# Patient Record
Sex: MALE | Race: WHITE | Employment: OTHER | URBAN - METROPOLITAN AREA
[De-identification: names, ages, dates, MRNs, and addresses within clinical notes are randomized per-mention and may not be internally consistent; named-entity substitution may affect disease eponyms.]

---

## 2017-01-05 ENCOUNTER — OFFICE VISIT (OUTPATIENT)
Dept: CARDIOLOGY CLINIC | Age: 72
End: 2017-01-05

## 2017-01-05 VITALS
WEIGHT: 200 LBS | OXYGEN SATURATION: 95 % | SYSTOLIC BLOOD PRESSURE: 138 MMHG | HEART RATE: 79 BPM | BODY MASS INDEX: 28.63 KG/M2 | DIASTOLIC BLOOD PRESSURE: 66 MMHG | HEIGHT: 70 IN

## 2017-01-05 DIAGNOSIS — E78.5 HYPERLIPIDEMIA, UNSPECIFIED HYPERLIPIDEMIA TYPE: ICD-10-CM

## 2017-01-05 DIAGNOSIS — I25.10 ATHEROSCLEROSIS OF NATIVE CORONARY ARTERY OF NATIVE HEART WITHOUT ANGINA PECTORIS: Primary | ICD-10-CM

## 2017-01-05 DIAGNOSIS — Z98.61 S/P PTCA (PERCUTANEOUS TRANSLUMINAL CORONARY ANGIOPLASTY): ICD-10-CM

## 2017-01-05 DIAGNOSIS — I10 ESSENTIAL HYPERTENSION, BENIGN: ICD-10-CM

## 2017-01-05 PROCEDURE — 99214 OFFICE O/P EST MOD 30 MIN: CPT | Performed by: INTERNAL MEDICINE

## 2017-01-05 RX ORDER — PANTOPRAZOLE SODIUM 40 MG/1
40 TABLET, DELAYED RELEASE ORAL DAILY
COMMUNITY
Start: 2016-12-27 | End: 2017-07-10

## 2017-05-05 ENCOUNTER — TELEPHONE (OUTPATIENT)
Dept: CARDIOLOGY CLINIC | Age: 72
End: 2017-05-05

## 2017-07-10 ENCOUNTER — OFFICE VISIT (OUTPATIENT)
Dept: CARDIOLOGY CLINIC | Age: 72
End: 2017-07-10

## 2017-07-10 ENCOUNTER — HOSPITAL ENCOUNTER (OUTPATIENT)
Dept: NON INVASIVE DIAGNOSTICS | Age: 72
Discharge: HOME | End: 2017-07-10
Attending: INTERNAL MEDICINE | Admitting: INTERNAL MEDICINE

## 2017-07-10 VITALS
WEIGHT: 193 LBS | HEART RATE: 61 BPM | BODY MASS INDEX: 27.63 KG/M2 | HEIGHT: 70 IN | DIASTOLIC BLOOD PRESSURE: 82 MMHG | SYSTOLIC BLOOD PRESSURE: 138 MMHG

## 2017-07-10 DIAGNOSIS — I25.10 ATHEROSCLEROTIC HEART DISEASE OF NATIVE CORONARY ARTERY WITHOUT ANGINA PECTORIS: ICD-10-CM

## 2017-07-10 DIAGNOSIS — I25.10 ATHEROSCLEROSIS OF NATIVE CORONARY ARTERY OF NATIVE HEART WITHOUT ANGINA PECTORIS: Primary | ICD-10-CM

## 2017-07-10 DIAGNOSIS — E78.49 OTHER HYPERLIPIDEMIA: ICD-10-CM

## 2017-07-10 DIAGNOSIS — I10 ESSENTIAL HYPERTENSION, BENIGN: ICD-10-CM

## 2017-07-10 DIAGNOSIS — I65.23 BILATERAL CAROTID ARTERY STENOSIS: ICD-10-CM

## 2017-07-10 PROCEDURE — G8427 DOCREV CUR MEDS BY ELIG CLIN: HCPCS | Performed by: INTERNAL MEDICINE

## 2017-07-10 PROCEDURE — G8419 CALC BMI OUT NRM PARAM NOF/U: HCPCS | Performed by: INTERNAL MEDICINE

## 2017-07-10 PROCEDURE — 3017F COLORECTAL CA SCREEN DOC REV: CPT | Performed by: INTERNAL MEDICINE

## 2017-07-10 PROCEDURE — 4040F PNEUMOC VAC/ADMIN/RCVD: CPT | Performed by: INTERNAL MEDICINE

## 2017-07-10 PROCEDURE — G8598 ASA/ANTIPLAT THER USED: HCPCS | Performed by: INTERNAL MEDICINE

## 2017-07-10 PROCEDURE — 1123F ACP DISCUSS/DSCN MKR DOCD: CPT | Performed by: INTERNAL MEDICINE

## 2017-07-10 PROCEDURE — 99214 OFFICE O/P EST MOD 30 MIN: CPT | Performed by: INTERNAL MEDICINE

## 2017-07-10 PROCEDURE — 1036F TOBACCO NON-USER: CPT | Performed by: INTERNAL MEDICINE

## 2017-07-10 RX ORDER — LISINOPRIL 20 MG/1
40 TABLET ORAL
Refills: 0 | COMMUNITY
Start: 2017-05-19 | End: 2019-08-09

## 2017-10-27 ENCOUNTER — TELEPHONE (OUTPATIENT)
Dept: CARDIOLOGY CLINIC | Age: 72
End: 2017-10-27

## 2018-04-17 ENCOUNTER — TELEPHONE (OUTPATIENT)
Dept: ENT CLINIC | Age: 73
End: 2018-04-17

## 2018-04-18 ENCOUNTER — OFFICE VISIT (OUTPATIENT)
Dept: ENT CLINIC | Age: 73
End: 2018-04-18

## 2018-04-18 VITALS
HEART RATE: 50 BPM | BODY MASS INDEX: 27.92 KG/M2 | HEIGHT: 70 IN | WEIGHT: 195 LBS | DIASTOLIC BLOOD PRESSURE: 71 MMHG | SYSTOLIC BLOOD PRESSURE: 167 MMHG

## 2018-04-18 DIAGNOSIS — R04.0 EPISTAXIS: Primary | ICD-10-CM

## 2018-04-18 DIAGNOSIS — H61.23 BILATERAL IMPACTED CERUMEN: ICD-10-CM

## 2018-04-18 DIAGNOSIS — H91.93 BILATERAL HEARING LOSS, UNSPECIFIED HEARING LOSS TYPE: ICD-10-CM

## 2018-04-18 DIAGNOSIS — H93.13 SUBJECTIVE TINNITUS OF BOTH EARS: ICD-10-CM

## 2018-04-18 PROCEDURE — 1036F TOBACCO NON-USER: CPT | Performed by: OTOLARYNGOLOGY

## 2018-04-18 PROCEDURE — 1123F ACP DISCUSS/DSCN MKR DOCD: CPT | Performed by: OTOLARYNGOLOGY

## 2018-04-18 PROCEDURE — G8417 CALC BMI ABV UP PARAM F/U: HCPCS | Performed by: OTOLARYNGOLOGY

## 2018-04-18 PROCEDURE — G8598 ASA/ANTIPLAT THER USED: HCPCS | Performed by: OTOLARYNGOLOGY

## 2018-04-18 PROCEDURE — G8427 DOCREV CUR MEDS BY ELIG CLIN: HCPCS | Performed by: OTOLARYNGOLOGY

## 2018-04-18 PROCEDURE — 3017F COLORECTAL CA SCREEN DOC REV: CPT | Performed by: OTOLARYNGOLOGY

## 2018-04-18 PROCEDURE — 99203 OFFICE O/P NEW LOW 30 MIN: CPT | Performed by: OTOLARYNGOLOGY

## 2018-04-18 PROCEDURE — 4040F PNEUMOC VAC/ADMIN/RCVD: CPT | Performed by: OTOLARYNGOLOGY

## 2018-04-18 RX ORDER — HYDRALAZINE HYDROCHLORIDE 25 MG/1
25 TABLET, FILM COATED ORAL 3 TIMES DAILY
COMMUNITY
Start: 2018-01-10 | End: 2018-04-23 | Stop reason: ALTCHOICE

## 2018-04-18 RX ORDER — ATENOLOL 50 MG/1
TABLET ORAL
Refills: 2 | COMMUNITY
Start: 2018-04-08 | End: 2018-05-08

## 2018-04-23 ENCOUNTER — TELEPHONE (OUTPATIENT)
Dept: CARDIOLOGY CLINIC | Age: 73
End: 2018-04-23

## 2018-04-23 ENCOUNTER — OFFICE VISIT (OUTPATIENT)
Dept: CARDIOLOGY CLINIC | Age: 73
End: 2018-04-23

## 2018-04-23 VITALS
DIASTOLIC BLOOD PRESSURE: 90 MMHG | HEART RATE: 72 BPM | SYSTOLIC BLOOD PRESSURE: 180 MMHG | WEIGHT: 197.12 LBS | HEIGHT: 70 IN | BODY MASS INDEX: 28.22 KG/M2

## 2018-04-23 DIAGNOSIS — R94.8 ABNORMAL RESULTS OF FUNCTION STUDIES OF OTHER ORGANS AND SYSTEMS: ICD-10-CM

## 2018-04-23 DIAGNOSIS — I10 ESSENTIAL HYPERTENSION, BENIGN: Primary | ICD-10-CM

## 2018-04-23 DIAGNOSIS — E78.49 OTHER HYPERLIPIDEMIA: ICD-10-CM

## 2018-04-23 DIAGNOSIS — R06.02 SOB (SHORTNESS OF BREATH): ICD-10-CM

## 2018-04-23 DIAGNOSIS — I25.10 ATHEROSCLEROSIS OF NATIVE CORONARY ARTERY OF NATIVE HEART WITHOUT ANGINA PECTORIS: ICD-10-CM

## 2018-04-23 PROCEDURE — G8417 CALC BMI ABV UP PARAM F/U: HCPCS | Performed by: INTERNAL MEDICINE

## 2018-04-23 PROCEDURE — 3017F COLORECTAL CA SCREEN DOC REV: CPT | Performed by: INTERNAL MEDICINE

## 2018-04-23 PROCEDURE — 99214 OFFICE O/P EST MOD 30 MIN: CPT | Performed by: INTERNAL MEDICINE

## 2018-04-23 PROCEDURE — G8598 ASA/ANTIPLAT THER USED: HCPCS | Performed by: INTERNAL MEDICINE

## 2018-04-23 PROCEDURE — 4040F PNEUMOC VAC/ADMIN/RCVD: CPT | Performed by: INTERNAL MEDICINE

## 2018-04-23 PROCEDURE — 1123F ACP DISCUSS/DSCN MKR DOCD: CPT | Performed by: INTERNAL MEDICINE

## 2018-04-23 PROCEDURE — G8427 DOCREV CUR MEDS BY ELIG CLIN: HCPCS | Performed by: INTERNAL MEDICINE

## 2018-04-23 PROCEDURE — 1036F TOBACCO NON-USER: CPT | Performed by: INTERNAL MEDICINE

## 2018-04-23 RX ORDER — DOXAZOSIN MESYLATE 4 MG/1
TABLET ORAL
Qty: 60 TABLET | Refills: 3 | Status: SHIPPED | OUTPATIENT
Start: 2018-04-23 | End: 2018-04-23 | Stop reason: SDUPTHER

## 2018-04-23 RX ORDER — DOXAZOSIN 8 MG/1
TABLET ORAL
Qty: 30 TABLET | Refills: 11 | Status: SHIPPED | OUTPATIENT
Start: 2018-04-23 | End: 2018-07-18 | Stop reason: SDUPTHER

## 2018-04-24 ENCOUNTER — HOSPITAL ENCOUNTER (OUTPATIENT)
Dept: ULTRASOUND IMAGING | Age: 73
Discharge: HOME | End: 2018-04-24
Attending: INTERNAL MEDICINE | Admitting: INTERNAL MEDICINE

## 2018-04-24 DIAGNOSIS — I10 ESSENTIAL HYPERTENSION, BENIGN: ICD-10-CM

## 2018-04-24 DIAGNOSIS — I10 ESSENTIAL (PRIMARY) HYPERTENSION: ICD-10-CM

## 2018-04-24 LAB
ANION GAP SERPL CALCULATED.3IONS-SCNC: 11 MMOL/L (ref 3–16)
BUN BLDV-MCNC: 25 MG/DL (ref 7–20)
CALCIUM SERPL-MCNC: 9.6 MG/DL (ref 8.3–10.6)
CHLORIDE BLD-SCNC: 103 MMOL/L (ref 99–110)
CO2: 31 MMOL/L (ref 21–32)
CREAT SERPL-MCNC: 1 MG/DL (ref 0.8–1.3)
GFR AFRICAN AMERICAN: >60
GFR NON-AFRICAN AMERICAN: >60
GLUCOSE BLD-MCNC: 151 MG/DL (ref 70–99)
POTASSIUM SERPL-SCNC: 4.3 MMOL/L (ref 3.5–5.1)
PROSTATE SPECIFIC ANTIGEN: 1.83 NG/ML (ref 0–4)
SODIUM BLD-SCNC: 145 MMOL/L (ref 136–145)

## 2018-05-08 ENCOUNTER — OFFICE VISIT (OUTPATIENT)
Dept: CARDIOLOGY CLINIC | Age: 73
End: 2018-05-08

## 2018-05-08 VITALS
WEIGHT: 199.5 LBS | HEART RATE: 77 BPM | BODY MASS INDEX: 28.56 KG/M2 | HEIGHT: 70 IN | DIASTOLIC BLOOD PRESSURE: 70 MMHG | OXYGEN SATURATION: 92 % | SYSTOLIC BLOOD PRESSURE: 160 MMHG

## 2018-05-08 DIAGNOSIS — I25.10 ATHEROSCLEROSIS OF NATIVE CORONARY ARTERY OF NATIVE HEART WITHOUT ANGINA PECTORIS: ICD-10-CM

## 2018-05-08 DIAGNOSIS — I10 ESSENTIAL HYPERTENSION, BENIGN: Primary | ICD-10-CM

## 2018-05-08 DIAGNOSIS — I65.23 BILATERAL CAROTID ARTERY STENOSIS: ICD-10-CM

## 2018-05-08 DIAGNOSIS — E78.49 OTHER HYPERLIPIDEMIA: ICD-10-CM

## 2018-05-08 PROCEDURE — 99214 OFFICE O/P EST MOD 30 MIN: CPT | Performed by: NURSE PRACTITIONER

## 2018-05-08 PROCEDURE — 3017F COLORECTAL CA SCREEN DOC REV: CPT | Performed by: NURSE PRACTITIONER

## 2018-05-08 PROCEDURE — G8427 DOCREV CUR MEDS BY ELIG CLIN: HCPCS | Performed by: NURSE PRACTITIONER

## 2018-05-08 PROCEDURE — 1036F TOBACCO NON-USER: CPT | Performed by: NURSE PRACTITIONER

## 2018-05-08 PROCEDURE — G8417 CALC BMI ABV UP PARAM F/U: HCPCS | Performed by: NURSE PRACTITIONER

## 2018-05-08 PROCEDURE — 1123F ACP DISCUSS/DSCN MKR DOCD: CPT | Performed by: NURSE PRACTITIONER

## 2018-05-08 PROCEDURE — G8598 ASA/ANTIPLAT THER USED: HCPCS | Performed by: NURSE PRACTITIONER

## 2018-05-08 PROCEDURE — 4040F PNEUMOC VAC/ADMIN/RCVD: CPT | Performed by: NURSE PRACTITIONER

## 2018-05-08 RX ORDER — CARVEDILOL 12.5 MG/1
12.5 TABLET ORAL 2 TIMES DAILY WITH MEALS
Qty: 60 TABLET | Refills: 3 | Status: SHIPPED | OUTPATIENT
Start: 2018-05-08 | End: 2018-09-03 | Stop reason: SDUPTHER

## 2018-05-09 ENCOUNTER — OFFICE VISIT (OUTPATIENT)
Dept: ENT CLINIC | Age: 73
End: 2018-05-09

## 2018-05-09 VITALS
HEART RATE: 55 BPM | BODY MASS INDEX: 28.2 KG/M2 | WEIGHT: 197 LBS | HEIGHT: 70 IN | DIASTOLIC BLOOD PRESSURE: 84 MMHG | SYSTOLIC BLOOD PRESSURE: 162 MMHG

## 2018-05-09 DIAGNOSIS — R04.0 EPISTAXIS: ICD-10-CM

## 2018-05-09 PROCEDURE — 99999 PR OFFICE/OUTPT VISIT,PROCEDURE ONLY: CPT | Performed by: OTOLARYNGOLOGY

## 2018-05-09 PROCEDURE — 30903 CONTROL OF NOSEBLEED: CPT | Performed by: OTOLARYNGOLOGY

## 2018-05-14 ENCOUNTER — TELEPHONE (OUTPATIENT)
Dept: CARDIOLOGY CLINIC | Age: 73
End: 2018-05-14

## 2018-05-29 ENCOUNTER — OFFICE VISIT (OUTPATIENT)
Dept: CARDIOLOGY CLINIC | Age: 73
End: 2018-05-29

## 2018-05-29 VITALS
DIASTOLIC BLOOD PRESSURE: 62 MMHG | WEIGHT: 196.8 LBS | SYSTOLIC BLOOD PRESSURE: 140 MMHG | BODY MASS INDEX: 28.18 KG/M2 | HEIGHT: 70 IN | HEART RATE: 60 BPM

## 2018-05-29 DIAGNOSIS — I65.23 BILATERAL CAROTID ARTERY STENOSIS: ICD-10-CM

## 2018-05-29 DIAGNOSIS — I25.10 ATHEROSCLEROSIS OF NATIVE CORONARY ARTERY OF NATIVE HEART WITHOUT ANGINA PECTORIS: ICD-10-CM

## 2018-05-29 DIAGNOSIS — E78.2 MIXED HYPERLIPIDEMIA: ICD-10-CM

## 2018-05-29 DIAGNOSIS — I10 ESSENTIAL HYPERTENSION, BENIGN: Primary | ICD-10-CM

## 2018-05-29 PROCEDURE — G8598 ASA/ANTIPLAT THER USED: HCPCS | Performed by: NURSE PRACTITIONER

## 2018-05-29 PROCEDURE — 4040F PNEUMOC VAC/ADMIN/RCVD: CPT | Performed by: NURSE PRACTITIONER

## 2018-05-29 PROCEDURE — G8427 DOCREV CUR MEDS BY ELIG CLIN: HCPCS | Performed by: NURSE PRACTITIONER

## 2018-05-29 PROCEDURE — 1036F TOBACCO NON-USER: CPT | Performed by: NURSE PRACTITIONER

## 2018-05-29 PROCEDURE — 1123F ACP DISCUSS/DSCN MKR DOCD: CPT | Performed by: NURSE PRACTITIONER

## 2018-05-29 PROCEDURE — 3017F COLORECTAL CA SCREEN DOC REV: CPT | Performed by: NURSE PRACTITIONER

## 2018-05-29 PROCEDURE — G8417 CALC BMI ABV UP PARAM F/U: HCPCS | Performed by: NURSE PRACTITIONER

## 2018-05-29 PROCEDURE — 99214 OFFICE O/P EST MOD 30 MIN: CPT | Performed by: NURSE PRACTITIONER

## 2018-06-05 ENCOUNTER — HOSPITAL ENCOUNTER (OUTPATIENT)
Dept: VASCULAR LAB | Age: 73
Discharge: HOME | End: 2018-06-05
Attending: NURSE PRACTITIONER | Admitting: NURSE PRACTITIONER

## 2018-06-05 DIAGNOSIS — I65.23 BILATERAL CAROTID ARTERY STENOSIS: ICD-10-CM

## 2018-06-05 DIAGNOSIS — I65.23 OCCLUSION AND STENOSIS OF BILATERAL CAROTID ARTERIES: ICD-10-CM

## 2018-06-08 ENCOUNTER — TELEPHONE (OUTPATIENT)
Dept: CARDIOLOGY CLINIC | Age: 73
End: 2018-06-08

## 2018-07-18 ENCOUNTER — OFFICE VISIT (OUTPATIENT)
Dept: CARDIOLOGY CLINIC | Age: 73
End: 2018-07-18

## 2018-07-18 VITALS
BODY MASS INDEX: 28.35 KG/M2 | SYSTOLIC BLOOD PRESSURE: 154 MMHG | HEART RATE: 60 BPM | DIASTOLIC BLOOD PRESSURE: 81 MMHG | WEIGHT: 198 LBS | HEIGHT: 70 IN

## 2018-07-18 DIAGNOSIS — I25.10 ATHEROSCLEROSIS OF NATIVE CORONARY ARTERY OF NATIVE HEART WITHOUT ANGINA PECTORIS: Primary | ICD-10-CM

## 2018-07-18 DIAGNOSIS — E78.49 OTHER HYPERLIPIDEMIA: ICD-10-CM

## 2018-07-18 DIAGNOSIS — I10 ESSENTIAL HYPERTENSION, BENIGN: ICD-10-CM

## 2018-07-18 PROCEDURE — G8598 ASA/ANTIPLAT THER USED: HCPCS | Performed by: INTERNAL MEDICINE

## 2018-07-18 PROCEDURE — 1101F PT FALLS ASSESS-DOCD LE1/YR: CPT | Performed by: INTERNAL MEDICINE

## 2018-07-18 PROCEDURE — 1123F ACP DISCUSS/DSCN MKR DOCD: CPT | Performed by: INTERNAL MEDICINE

## 2018-07-18 PROCEDURE — 99214 OFFICE O/P EST MOD 30 MIN: CPT | Performed by: INTERNAL MEDICINE

## 2018-07-18 PROCEDURE — 3017F COLORECTAL CA SCREEN DOC REV: CPT | Performed by: INTERNAL MEDICINE

## 2018-07-18 PROCEDURE — 1036F TOBACCO NON-USER: CPT | Performed by: INTERNAL MEDICINE

## 2018-07-18 PROCEDURE — 4040F PNEUMOC VAC/ADMIN/RCVD: CPT | Performed by: INTERNAL MEDICINE

## 2018-07-18 PROCEDURE — G8417 CALC BMI ABV UP PARAM F/U: HCPCS | Performed by: INTERNAL MEDICINE

## 2018-07-18 PROCEDURE — G8427 DOCREV CUR MEDS BY ELIG CLIN: HCPCS | Performed by: INTERNAL MEDICINE

## 2018-07-18 RX ORDER — DOXAZOSIN MESYLATE 4 MG/1
4 TABLET ORAL EVERY MORNING
Qty: 60 TABLET | Refills: 6
Start: 2018-07-18 | End: 2018-10-08 | Stop reason: ALTCHOICE

## 2018-07-18 RX ORDER — DOXAZOSIN MESYLATE 4 MG/1
4 TABLET ORAL 2 TIMES DAILY
Qty: 60 TABLET | Refills: 6
Start: 2018-07-18 | End: 2018-07-18 | Stop reason: SDUPTHER

## 2018-07-18 RX ORDER — DOXAZOSIN 8 MG/1
8 TABLET ORAL NIGHTLY
Qty: 30 TABLET | Refills: 3
Start: 2018-07-18 | End: 2018-10-08 | Stop reason: SDUPTHER

## 2018-07-18 NOTE — PROGRESS NOTES
hypertrophy is present.   -There is reversal of E/A inflow velocities across the mitral valve.   -Diastolic filling parameters suggests grade I diastolic dysfunction . E/e'=12.3 .   -No evidence of significant valvular insufficiency. GXT Myoview 7/2016  Summary    Small sized inferior fixed defect consistent with diaphragmatic artifact in    the territory of the mid and distal RCA .    Normal LV size and systolic function. Assessment/Plan:    1. Coronary artery disease: Stable. No anginal syptoms. 2005 Cath> Cypher AARON to the LAD.   8/2013 GXT Shant> normal perfusion/EF 78%, +CHAIREZ with exercise  7/2016 GXT Shant>small sized inferior fixed defect consistent with diaphragmatic artifact in the territory of the mid and distal RCA, normal LV size and function   2. Essential hypertension, benign:     3. Other and unspecified hyperlipidemia:  Stable. Managed per PCP. 4.   Carotid stenosis -stable, doppler 6/18: <50% bilateral    Plan:  Change Cardura to 4mg in am and 8mg at night. He has been advised to take his blood pressure pills together as opposed to splitting them up throughout the night. Obtain CTA abdominal aorta w/ contrast to evaluate for PAD and renal artery stenosis. Repeat stress echo. FU 3 months. Scribe's attestation: This note was scribed in the presence of Dr Ava Castaneda MD by Shiloh Tate RN. I appreciate the opportunity of cooperating in the care of this individual.    Rossy Presley. Ava Castaneda M.D., [facility] [place]    The scribe's documentation has been prepared under my direction and personally reviewed by me in its entirety. I confirm that the note above accurately reflects all work, treatment, procedures, and medical decision making performed by me.

## 2018-07-19 ENCOUNTER — TELEPHONE (OUTPATIENT)
Dept: CARDIOLOGY CLINIC | Age: 73
End: 2018-07-19

## 2018-07-19 NOTE — TELEPHONE ENCOUNTER
Patient is saying his cardura was increased and he needs to get a new rx for cardura 4mg .  Please call pharmacy with new rx

## 2018-08-10 ENCOUNTER — TELEPHONE (OUTPATIENT)
Dept: CARDIOLOGY CLINIC | Age: 73
End: 2018-08-10

## 2018-08-13 ENCOUNTER — HOSPITAL ENCOUNTER (OUTPATIENT)
Dept: NON INVASIVE DIAGNOSTICS | Age: 73
Discharge: HOME | End: 2018-08-13
Attending: INTERNAL MEDICINE | Admitting: INTERNAL MEDICINE

## 2018-08-13 DIAGNOSIS — I25.10 ATHEROSCLEROTIC HEART DISEASE OF NATIVE CORONARY ARTERY WITHOUT ANGINA PECTORIS: ICD-10-CM

## 2018-08-13 DIAGNOSIS — I10 ESSENTIAL HYPERTENSION, BENIGN: ICD-10-CM

## 2018-08-13 DIAGNOSIS — Z01.818 PRE-OP TESTING: Primary | ICD-10-CM

## 2018-08-15 ENCOUNTER — TELEPHONE (OUTPATIENT)
Dept: CARDIOLOGY CLINIC | Age: 73
End: 2018-08-15

## 2018-09-06 RX ORDER — CARVEDILOL 12.5 MG/1
TABLET ORAL
Qty: 180 TABLET | Refills: 3 | Status: SHIPPED | OUTPATIENT
Start: 2018-09-06 | End: 2019-02-15 | Stop reason: SDUPTHER

## 2018-10-08 ENCOUNTER — OFFICE VISIT (OUTPATIENT)
Dept: CARDIOLOGY CLINIC | Age: 73
End: 2018-10-08
Payer: MEDICARE

## 2018-10-08 VITALS
SYSTOLIC BLOOD PRESSURE: 160 MMHG | BODY MASS INDEX: 28.06 KG/M2 | HEART RATE: 71 BPM | HEIGHT: 70 IN | OXYGEN SATURATION: 96 % | DIASTOLIC BLOOD PRESSURE: 94 MMHG | WEIGHT: 196 LBS

## 2018-10-08 DIAGNOSIS — I65.23 BILATERAL CAROTID ARTERY STENOSIS: ICD-10-CM

## 2018-10-08 DIAGNOSIS — E78.49 OTHER HYPERLIPIDEMIA: ICD-10-CM

## 2018-10-08 DIAGNOSIS — I10 ESSENTIAL HYPERTENSION, BENIGN: ICD-10-CM

## 2018-10-08 DIAGNOSIS — I25.10 ATHEROSCLEROSIS OF NATIVE CORONARY ARTERY OF NATIVE HEART WITHOUT ANGINA PECTORIS: Primary | ICD-10-CM

## 2018-10-08 PROCEDURE — 3017F COLORECTAL CA SCREEN DOC REV: CPT | Performed by: INTERNAL MEDICINE

## 2018-10-08 PROCEDURE — 1036F TOBACCO NON-USER: CPT | Performed by: INTERNAL MEDICINE

## 2018-10-08 PROCEDURE — 99214 OFFICE O/P EST MOD 30 MIN: CPT | Performed by: INTERNAL MEDICINE

## 2018-10-08 PROCEDURE — 4040F PNEUMOC VAC/ADMIN/RCVD: CPT | Performed by: INTERNAL MEDICINE

## 2018-10-08 PROCEDURE — G8417 CALC BMI ABV UP PARAM F/U: HCPCS | Performed by: INTERNAL MEDICINE

## 2018-10-08 PROCEDURE — G8484 FLU IMMUNIZE NO ADMIN: HCPCS | Performed by: INTERNAL MEDICINE

## 2018-10-08 PROCEDURE — 1123F ACP DISCUSS/DSCN MKR DOCD: CPT | Performed by: INTERNAL MEDICINE

## 2018-10-08 PROCEDURE — G8427 DOCREV CUR MEDS BY ELIG CLIN: HCPCS | Performed by: INTERNAL MEDICINE

## 2018-10-08 PROCEDURE — 1101F PT FALLS ASSESS-DOCD LE1/YR: CPT | Performed by: INTERNAL MEDICINE

## 2018-10-08 PROCEDURE — G8598 ASA/ANTIPLAT THER USED: HCPCS | Performed by: INTERNAL MEDICINE

## 2018-10-08 RX ORDER — DOXAZOSIN 8 MG/1
8 TABLET ORAL 2 TIMES DAILY
Qty: 60 TABLET | Refills: 5 | Status: SHIPPED | OUTPATIENT
Start: 2018-10-08 | End: 2018-10-15 | Stop reason: SDUPTHER

## 2018-10-08 NOTE — PROGRESS NOTES
hypertrophy is present.   -There is reversal of E/A inflow velocities across the mitral valve.   -Diastolic filling parameters suggests grade I diastolic dysfunction . E/e'=12.3 .   -No evidence of significant valvular insufficiency. GXT Myoview 7/2016  Summary    Small sized inferior fixed defect consistent with diaphragmatic artifact in    the territory of the mid and distal RCA .    Normal LV size and systolic function. Assessment/Plan:    1. Coronary artery disease: Stable. No anginal syptoms. 2005 Cath> Cypher AARON to the LAD.   8/2013 GXT Shant> normal perfusion/EF 78%, +CHAIREZ with exercise  7/2016 GXT Shant>small sized inferior fixed defect consistent with diaphragmatic artifact in the territory of the mid and distal RCA, normal LV size and function   2. Essential hypertension, benign:     3. Other and unspecified hyperlipidemia:  Stable. Managed per PCP. 4.   Carotid stenosis -stable, doppler 6/18: <50% bilateral    Plan:  Change Cardura to 8 mg twice daily. Follow up in 4 months    Scribe's attestation: This note was scribed in the presence of Dr Irma Toscano MD by Evelyn Hicks RN. I appreciate the opportunity of cooperating in the care of this individual.    Edouard Calderon. Irma Toscano M.D., [facility] [place]    The scribe's documentation has been prepared under my direction and personally reviewed by me in its entirety. I confirm that the note above accurately reflects all work, treatment, procedures, and medical decision making performed by me.

## 2018-10-15 RX ORDER — DOXAZOSIN 8 MG/1
8 TABLET ORAL 2 TIMES DAILY
Qty: 180 TABLET | Refills: 1 | Status: SHIPPED | OUTPATIENT
Start: 2018-10-15 | End: 2019-02-15 | Stop reason: SDUPTHER

## 2019-02-15 ENCOUNTER — OFFICE VISIT (OUTPATIENT)
Dept: CARDIOLOGY CLINIC | Age: 74
End: 2019-02-15
Payer: MEDICARE

## 2019-02-15 VITALS
BODY MASS INDEX: 28.53 KG/M2 | HEIGHT: 70 IN | HEART RATE: 64 BPM | WEIGHT: 199.3 LBS | SYSTOLIC BLOOD PRESSURE: 178 MMHG | DIASTOLIC BLOOD PRESSURE: 90 MMHG

## 2019-02-15 DIAGNOSIS — I10 ESSENTIAL HYPERTENSION: ICD-10-CM

## 2019-02-15 DIAGNOSIS — E78.5 HYPERLIPIDEMIA, UNSPECIFIED HYPERLIPIDEMIA TYPE: ICD-10-CM

## 2019-02-15 DIAGNOSIS — I25.10 CORONARY ARTERY DISEASE INVOLVING NATIVE CORONARY ARTERY OF NATIVE HEART WITHOUT ANGINA PECTORIS: Primary | ICD-10-CM

## 2019-02-15 DIAGNOSIS — I65.23 BILATERAL CAROTID ARTERY STENOSIS: ICD-10-CM

## 2019-02-15 PROCEDURE — 4040F PNEUMOC VAC/ADMIN/RCVD: CPT | Performed by: INTERNAL MEDICINE

## 2019-02-15 PROCEDURE — G8484 FLU IMMUNIZE NO ADMIN: HCPCS | Performed by: INTERNAL MEDICINE

## 2019-02-15 PROCEDURE — G8417 CALC BMI ABV UP PARAM F/U: HCPCS | Performed by: INTERNAL MEDICINE

## 2019-02-15 PROCEDURE — 99214 OFFICE O/P EST MOD 30 MIN: CPT | Performed by: INTERNAL MEDICINE

## 2019-02-15 PROCEDURE — 3017F COLORECTAL CA SCREEN DOC REV: CPT | Performed by: INTERNAL MEDICINE

## 2019-02-15 PROCEDURE — 1123F ACP DISCUSS/DSCN MKR DOCD: CPT | Performed by: INTERNAL MEDICINE

## 2019-02-15 PROCEDURE — G8598 ASA/ANTIPLAT THER USED: HCPCS | Performed by: INTERNAL MEDICINE

## 2019-02-15 PROCEDURE — G8427 DOCREV CUR MEDS BY ELIG CLIN: HCPCS | Performed by: INTERNAL MEDICINE

## 2019-02-15 PROCEDURE — 1101F PT FALLS ASSESS-DOCD LE1/YR: CPT | Performed by: INTERNAL MEDICINE

## 2019-02-15 PROCEDURE — 1036F TOBACCO NON-USER: CPT | Performed by: INTERNAL MEDICINE

## 2019-03-03 RX ORDER — DOXAZOSIN 8 MG/1
8 TABLET ORAL 2 TIMES DAILY
Qty: 180 TABLET | Refills: 3 | Status: SHIPPED | OUTPATIENT
Start: 2019-03-03 | End: 2019-05-10 | Stop reason: SDUPTHER

## 2019-03-03 RX ORDER — CARVEDILOL 12.5 MG/1
12.5 TABLET ORAL 2 TIMES DAILY
Qty: 180 TABLET | Refills: 3 | Status: SHIPPED | OUTPATIENT
Start: 2019-03-03 | End: 2019-09-04 | Stop reason: SDUPTHER

## 2019-05-08 NOTE — TELEPHONE ENCOUNTER
Medication Question/Concern    What is the name of the medication you need to speak with someone about? Doseage of the medication:    How are you taking this medication:    What issues/concerns are you having with this medication:                Medication Refill    When was your last appointment with cardiology? 02/15/19 (Upcoming appt 09/04/19 with LES)  (if 1year or longer, please schedule an appointment)    Medication needing refilled:doxazosin (CARDURA) 8 MG tablet     Doseage of the medication:8 mg    How are you taking this medication (QD, BID, TID, QID, PRN):1 tab BID    Patient want a 30 or 90 day supply called in:90    Which Pharmacy are we sending the medication to:[facility]/pharmacy #[id number]- [place], [place] - [address] [phone] Amber Thomas [phone]    Pharmacy Phone number:    Pharmacy Fax number:

## 2019-05-08 NOTE — TELEPHONE ENCOUNTER
Pended a refill request for your approval for Cardura 8 mg #180. Please advise. Last OV with LES 2/15/19  Plan:  Mr. Nguyễn Carrera has a stable cardiac status. He continues to struggle with elevated pressures at times, will monitor. 1. No med changes at this time. [place] BP control seems better based on his readings  2. Will continue with risk factor modifications.   3. Return for regular follow up in 6 months.

## 2019-05-10 RX ORDER — DOXAZOSIN 8 MG/1
8 TABLET ORAL 2 TIMES DAILY
Qty: 180 TABLET | Refills: 3 | Status: SHIPPED | OUTPATIENT
Start: 2019-05-10 | End: 2019-09-04 | Stop reason: SDUPTHER

## 2019-07-26 ENCOUNTER — TELEPHONE (OUTPATIENT)
Dept: CARDIOLOGY CLINIC | Age: 74
End: 2019-07-26

## 2019-08-09 ENCOUNTER — OFFICE VISIT (OUTPATIENT)
Dept: CARDIOLOGY CLINIC | Age: 74
End: 2019-08-09
Payer: MEDICARE

## 2019-08-09 VITALS
HEART RATE: 60 BPM | WEIGHT: 196 LBS | SYSTOLIC BLOOD PRESSURE: 190 MMHG | OXYGEN SATURATION: 94 % | BODY MASS INDEX: 28.06 KG/M2 | HEIGHT: 70 IN | DIASTOLIC BLOOD PRESSURE: 90 MMHG

## 2019-08-09 DIAGNOSIS — E78.49 OTHER HYPERLIPIDEMIA: ICD-10-CM

## 2019-08-09 DIAGNOSIS — I25.10 CORONARY ARTERY DISEASE INVOLVING NATIVE CORONARY ARTERY OF NATIVE HEART WITHOUT ANGINA PECTORIS: ICD-10-CM

## 2019-08-09 DIAGNOSIS — I10 ESSENTIAL HYPERTENSION: Primary | ICD-10-CM

## 2019-08-09 PROCEDURE — G8427 DOCREV CUR MEDS BY ELIG CLIN: HCPCS | Performed by: NURSE PRACTITIONER

## 2019-08-09 PROCEDURE — 99214 OFFICE O/P EST MOD 30 MIN: CPT | Performed by: NURSE PRACTITIONER

## 2019-08-09 PROCEDURE — G8598 ASA/ANTIPLAT THER USED: HCPCS | Performed by: NURSE PRACTITIONER

## 2019-08-09 PROCEDURE — 1036F TOBACCO NON-USER: CPT | Performed by: NURSE PRACTITIONER

## 2019-08-09 PROCEDURE — 4040F PNEUMOC VAC/ADMIN/RCVD: CPT | Performed by: NURSE PRACTITIONER

## 2019-08-09 PROCEDURE — G8417 CALC BMI ABV UP PARAM F/U: HCPCS | Performed by: NURSE PRACTITIONER

## 2019-08-09 PROCEDURE — 1123F ACP DISCUSS/DSCN MKR DOCD: CPT | Performed by: NURSE PRACTITIONER

## 2019-08-09 RX ORDER — LISINOPRIL AND HYDROCHLOROTHIAZIDE 25; 20 MG/1; MG/1
1 TABLET ORAL DAILY
Qty: 30 TABLET | Refills: 3
Start: 2019-08-09

## 2019-08-09 RX ORDER — LISINOPRIL 40 MG/1
20 TABLET ORAL DAILY
Qty: 30 TABLET | Refills: 3
Start: 2019-08-09 | End: 2019-12-04 | Stop reason: ALTCHOICE

## 2019-08-09 RX ORDER — LISINOPRIL 40 MG/1
40 TABLET ORAL DAILY
COMMUNITY
End: 2019-08-09

## 2019-08-16 ENCOUNTER — TELEPHONE (OUTPATIENT)
Dept: CARDIOLOGY CLINIC | Age: 74
End: 2019-08-16

## 2019-08-29 ENCOUNTER — TELEPHONE (OUTPATIENT)
Dept: CARDIOLOGY CLINIC | Age: 74
End: 2019-08-29

## 2019-08-29 ENCOUNTER — HOSPITAL ENCOUNTER (EMERGENCY)
Age: 74
Discharge: HOME | End: 2019-08-29
Attending: EMERGENCY MEDICINE
Payer: MEDICARE

## 2019-08-29 VITALS
OXYGEN SATURATION: 95 % | WEIGHT: 195 LBS | HEIGHT: 70 IN | HEART RATE: 64 BPM | RESPIRATION RATE: 16 BRPM | TEMPERATURE: 97.3 F | DIASTOLIC BLOOD PRESSURE: 64 MMHG | SYSTOLIC BLOOD PRESSURE: 111 MMHG | BODY MASS INDEX: 27.92 KG/M2

## 2019-08-29 DIAGNOSIS — R68.89 ALTERATION IN BLOOD PRESSURE: Primary | ICD-10-CM

## 2019-08-29 PROCEDURE — 99283 EMERGENCY DEPT VISIT LOW MDM: CPT

## 2019-08-29 PROCEDURE — 93005 ELECTROCARDIOGRAM TRACING: CPT | Performed by: EMERGENCY MEDICINE

## 2019-08-29 RX ORDER — NIFEDIPINE 20 MG/1
60 CAPSULE ORAL DAILY
COMMUNITY
End: 2019-09-04

## 2019-08-29 RX ORDER — HYDROCHLOROTHIAZIDE 25 MG/1
25 TABLET ORAL DAILY
COMMUNITY
End: 2019-12-04 | Stop reason: ALTCHOICE

## 2019-08-29 NOTE — PROGRESS NOTES
[address]   Cardiac Follow-up     Referring Provider:  Natalie Sarmiento MD     Chief Complaint   Patient presents with    Coronary Artery Disease    Hypertension     History of Present Illness:  Mr. Kris Sanchez is a 66 y.o. male who has a history of CAD, hypertension, hyperlipidemia, carotid stenosis. Today, Mr. Kris Sanchez states he still has blood pressure issues which causes him to not feel as well as he'd like. Today he states he is feeling dizzy. He reports low readings at times, as low as 396 systolic. He has brought along his BP log for review. He is able to do the activities that he wants during the day. He denies exertional chest pain, CHAIREZ/PND, palpitations, light-headedness, edema. He is active, does not smoke. Past Medical History:   has a past medical history of CAD (coronary artery disease), Hyperlipidemia, Hypertension, Screening for AAA (aortic abdominal aneurysm), and Sleep apnea. Surgical History:   has a past surgical history that includes Coronary angioplasty with stent (2005); Diagnostic Cardiac Cath Lab Procedure; and knee surgery (10/2015). Social History:   reports that he quit smoking about 41 years ago. His smoking use included cigarettes. He started smoking about 60 years ago. He has never used smokeless tobacco. He reports that he does not drink alcohol or use drugs. Family History:  family history includes Heart Attack in his father; Heart Disease in his father. Allergies:  Patient has no known allergies. Review of Systems:   · Constitutional: there has been no unanticipated weight loss. There's been no change in energy level, sleep pattern, or activity level. · Eyes: No visual changes or diplopia. No scleral icterus. · ENT: No Headaches, hearing loss or vertigo. No mouth sores or sore throat. · Cardiovascular: Reviewed in HPI  · Respiratory: No cough or wheezing, no sputum production. No hematemesis.     · Gastrointestinal: No abdominal pain, regional wall motion abnormalities noted.   -Mild concentric left ventricular hypertrophy.   -Normal diastolic function. E/e'=8.15    Stress echo 7/10/17:  Normal stress echo  Summary   -Normal left ventricle size and systolic function with an estimated ejection fraction of 55-60%.  -No regional wall motion abnormalities are seen.   -Mild concentric left ventricular hypertrophy is present.   -There is reversal of E/A inflow velocities across the mitral valve.   -Diastolic filling parameters suggests grade I diastolic dysfunction . E/e'=12.3 .   -No evidence of significant valvular insufficiency. GXT Myoview 7/2016  Summary    Small sized inferior fixed defect consistent with diaphragmatic artifact in    the territory of the mid and distal RCA .    Normal LV size and systolic function. Assessment:  1. Coronary artery disease: Stable. No anginal syptoms. 2005 Cath> Cypher AARON to the LAD.   8/2013 GXT Shant> normal perfusion/EF 78%, +CHAIREZ with exercise  7/2016 GXT Shant>small sized inferior fixed defect consistent with diaphragmatic artifact in the territory of the mid and distal RCA, normal LV size and function   2. Essential hypertension, benign:  Suboptimal. Will continue to monitor. Blood pressure (!) 178/88, pulse 60, height 5' 10\" (1.778 m), weight 193 lb 11.2 oz (87.9 kg). Plan:  Nifedipine 30mg nightly   PRN in the AM if Systolic Above 629, BP log reviewed    3. Other and unspecified hyperlipidemia:  Stable. Managed per PCP.   7/25/19>Chol 105, , HDL 35, LDL 43    4. Carotid stenosis: Stable, doppler 6/18> <50% bilateral    Plan:  Lab results reviewed and discussed. BP log reviewed with the patient. Mr. Shane Spencer has a stable cardiac status. He continues to struggle with elevated pressures at times, will monitor. Nifedipine 30mg nightly   PRN in the AM if Systolic Above 991  Will continue with risk factor modifications. Return for regular follow up in 3 months with NP and 6 months with me.      I

## 2019-08-29 NOTE — ED PROVIDER NOTES
DOXAZOSIN (CARDURA) 8 MG TABLET    Take 1 tablet by mouth 2 times daily    HYDROCHLOROTHIAZIDE (HYDRODIURIL) 25 MG TABLET    Take 25 mg by mouth daily    LISINOPRIL (PRINIVIL;ZESTRIL) 40 MG TABLET    Take 0.5 tablets by mouth daily    LISINOPRIL-HYDROCHLOROTHIAZIDE (PRINZIDE;ZESTORETIC) 20-25 MG PER TABLET    Take 1 tablet by mouth daily    NIFEDIPINE (PROCARDIA) 20 MG CAPSULE    Take 60 mg by mouth daily       ALLERGIES     Patient has no known allergies.     FAMILY HISTORY       Family History   Problem Relation Age of Onset    Heart Attack Father     Heart Disease Father     High Blood Pressure Neg Hx     High Cholesterol Neg Hx     Kidney Disease Neg Hx           SOCIAL HISTORY       Social History     Socioeconomic History    Marital status:      Spouse name: None    Number of children: None    Years of education: None    Highest education level: None   Occupational History    None   Social Needs    Financial resource strain: None    Food insecurity:     Worry: None     Inability: None    Transportation needs:     Medical: None     Non-medical: None   Tobacco Use    Smoking status: Former Smoker     Types: Cigarettes     Start date:      Last attempt to quit: 1978     Years since quittin.0    Smokeless tobacco: Never Used    Tobacco comment: quit when 33   Substance and Sexual Activity    Alcohol use: No    Drug use: No    Sexual activity: None     Comment:    Lifestyle    Physical activity:     Days per week: None     Minutes per session: None    Stress: None   Relationships    Social connections:     Talks on phone: None     Gets together: None     Attends [employer] service: None     Active member of club or organization: None     Attends meetings of clubs or organizations: None     Relationship status: None    Intimate partner violence:     Fear of current or ex partner: None     Emotionally abused: None     Physically abused: None     Forced sexual activity:

## 2019-08-30 LAB
EKG ATRIAL RATE: 54 BPM
EKG DIAGNOSIS: NORMAL
EKG P AXIS: 9 DEGREES
EKG P-R INTERVAL: 136 MS
EKG Q-T INTERVAL: 440 MS
EKG QRS DURATION: 104 MS
EKG QTC CALCULATION (BAZETT): 417 MS
EKG R AXIS: -36 DEGREES
EKG T AXIS: 7 DEGREES
EKG VENTRICULAR RATE: 54 BPM

## 2019-08-30 PROCEDURE — 93010 ELECTROCARDIOGRAM REPORT: CPT | Performed by: INTERNAL MEDICINE

## 2019-09-04 ENCOUNTER — OFFICE VISIT (OUTPATIENT)
Dept: CARDIOLOGY CLINIC | Age: 74
End: 2019-09-04
Payer: MEDICARE

## 2019-09-04 VITALS
BODY MASS INDEX: 27.73 KG/M2 | HEIGHT: 70 IN | WEIGHT: 193.7 LBS | DIASTOLIC BLOOD PRESSURE: 88 MMHG | SYSTOLIC BLOOD PRESSURE: 178 MMHG | HEART RATE: 60 BPM

## 2019-09-04 DIAGNOSIS — I25.10 CORONARY ARTERY DISEASE INVOLVING NATIVE CORONARY ARTERY OF NATIVE HEART WITHOUT ANGINA PECTORIS: Primary | ICD-10-CM

## 2019-09-04 DIAGNOSIS — E78.49 OTHER HYPERLIPIDEMIA: ICD-10-CM

## 2019-09-04 DIAGNOSIS — I65.23 BILATERAL CAROTID ARTERY STENOSIS: ICD-10-CM

## 2019-09-04 DIAGNOSIS — I10 ESSENTIAL HYPERTENSION: ICD-10-CM

## 2019-09-04 PROCEDURE — G8427 DOCREV CUR MEDS BY ELIG CLIN: HCPCS | Performed by: INTERNAL MEDICINE

## 2019-09-04 PROCEDURE — 1123F ACP DISCUSS/DSCN MKR DOCD: CPT | Performed by: INTERNAL MEDICINE

## 2019-09-04 PROCEDURE — G8598 ASA/ANTIPLAT THER USED: HCPCS | Performed by: INTERNAL MEDICINE

## 2019-09-04 PROCEDURE — 4040F PNEUMOC VAC/ADMIN/RCVD: CPT | Performed by: INTERNAL MEDICINE

## 2019-09-04 PROCEDURE — 99214 OFFICE O/P EST MOD 30 MIN: CPT | Performed by: INTERNAL MEDICINE

## 2019-09-04 PROCEDURE — G8417 CALC BMI ABV UP PARAM F/U: HCPCS | Performed by: INTERNAL MEDICINE

## 2019-09-04 PROCEDURE — 1036F TOBACCO NON-USER: CPT | Performed by: INTERNAL MEDICINE

## 2019-09-04 RX ORDER — CARVEDILOL 12.5 MG/1
25 TABLET ORAL 2 TIMES DAILY
Qty: 180 TABLET | Refills: 3 | Status: SHIPPED | OUTPATIENT
Start: 2019-09-04 | End: 2020-06-12

## 2019-09-04 RX ORDER — DOXAZOSIN 8 MG/1
8 TABLET ORAL 2 TIMES DAILY
Qty: 180 TABLET | Refills: 3 | Status: SHIPPED | OUTPATIENT
Start: 2019-09-04 | End: 2020-10-27

## 2019-09-04 RX ORDER — NIFEDIPINE 10 MG/1
30 CAPSULE ORAL NIGHTLY
Qty: 90 CAPSULE | Refills: 3 | Status: SHIPPED | OUTPATIENT
Start: 2019-09-04 | End: 2019-09-05 | Stop reason: ALTCHOICE

## 2019-09-05 RX ORDER — NIFEDIPINE 30 MG/1
30 TABLET, EXTENDED RELEASE ORAL NIGHTLY
Qty: 90 TABLET | Refills: 1 | Status: SHIPPED | OUTPATIENT
Start: 2019-09-05 | End: 2020-02-24

## 2019-10-31 ENCOUNTER — TELEPHONE (OUTPATIENT)
Dept: CARDIOLOGY CLINIC | Age: 74
End: 2019-10-31

## 2019-12-04 ENCOUNTER — OFFICE VISIT (OUTPATIENT)
Dept: CARDIOLOGY CLINIC | Age: 74
End: 2019-12-04
Payer: MEDICARE

## 2019-12-04 VITALS
OXYGEN SATURATION: 98 % | SYSTOLIC BLOOD PRESSURE: 118 MMHG | HEIGHT: 70 IN | DIASTOLIC BLOOD PRESSURE: 62 MMHG | WEIGHT: 194 LBS | BODY MASS INDEX: 27.77 KG/M2 | RESPIRATION RATE: 16 BRPM | HEART RATE: 65 BPM

## 2019-12-04 DIAGNOSIS — I10 ESSENTIAL HYPERTENSION: Primary | ICD-10-CM

## 2019-12-04 DIAGNOSIS — I65.23 BILATERAL CAROTID ARTERY STENOSIS: ICD-10-CM

## 2019-12-04 DIAGNOSIS — I25.10 CORONARY ARTERY DISEASE INVOLVING NATIVE CORONARY ARTERY OF NATIVE HEART WITHOUT ANGINA PECTORIS: ICD-10-CM

## 2019-12-04 DIAGNOSIS — E78.2 MIXED HYPERLIPIDEMIA: ICD-10-CM

## 2019-12-04 PROCEDURE — G8484 FLU IMMUNIZE NO ADMIN: HCPCS | Performed by: NURSE PRACTITIONER

## 2019-12-04 PROCEDURE — G8598 ASA/ANTIPLAT THER USED: HCPCS | Performed by: NURSE PRACTITIONER

## 2019-12-04 PROCEDURE — G8417 CALC BMI ABV UP PARAM F/U: HCPCS | Performed by: NURSE PRACTITIONER

## 2019-12-04 PROCEDURE — 99214 OFFICE O/P EST MOD 30 MIN: CPT | Performed by: NURSE PRACTITIONER

## 2019-12-04 PROCEDURE — G8427 DOCREV CUR MEDS BY ELIG CLIN: HCPCS | Performed by: NURSE PRACTITIONER

## 2019-12-04 PROCEDURE — 1036F TOBACCO NON-USER: CPT | Performed by: NURSE PRACTITIONER

## 2019-12-04 PROCEDURE — 4040F PNEUMOC VAC/ADMIN/RCVD: CPT | Performed by: NURSE PRACTITIONER

## 2019-12-04 PROCEDURE — 1123F ACP DISCUSS/DSCN MKR DOCD: CPT | Performed by: NURSE PRACTITIONER

## 2019-12-19 ENCOUNTER — HOSPITAL ENCOUNTER (OUTPATIENT)
Dept: GENERAL RADIOLOGY | Age: 74
Discharge: HOME | End: 2019-12-19
Payer: MEDICARE

## 2019-12-19 ENCOUNTER — HOSPITAL ENCOUNTER (OUTPATIENT)
Age: 74
Discharge: HOME | End: 2019-12-19
Payer: MEDICARE

## 2019-12-19 DIAGNOSIS — R05.9 COUGH: ICD-10-CM

## 2019-12-19 PROCEDURE — 71046 X-RAY EXAM CHEST 2 VIEWS: CPT

## 2019-12-31 ENCOUNTER — HOSPITAL ENCOUNTER (OUTPATIENT)
Dept: CT IMAGING | Age: 74
Discharge: HOME | End: 2019-12-31
Payer: MEDICARE

## 2019-12-31 ENCOUNTER — HOSPITAL ENCOUNTER (OUTPATIENT)
Dept: ULTRASOUND IMAGING | Age: 74
Discharge: HOME | End: 2019-12-31
Payer: MEDICARE

## 2019-12-31 PROCEDURE — 71260 CT THORAX DX C+: CPT

## 2019-12-31 PROCEDURE — 6360000004 HC RX CONTRAST MEDICATION: Performed by: FAMILY MEDICINE

## 2019-12-31 PROCEDURE — 76705 ECHO EXAM OF ABDOMEN: CPT

## 2019-12-31 RX ADMIN — IOPAMIDOL 75 ML: 755 INJECTION, SOLUTION INTRAVENOUS at 13:53

## 2020-01-14 ENCOUNTER — OFFICE VISIT (OUTPATIENT)
Dept: PULMONOLOGY | Age: 75
End: 2020-01-14
Payer: MEDICARE

## 2020-01-14 VITALS
DIASTOLIC BLOOD PRESSURE: 54 MMHG | WEIGHT: 199 LBS | HEART RATE: 65 BPM | OXYGEN SATURATION: 95 % | BODY MASS INDEX: 28.55 KG/M2 | SYSTOLIC BLOOD PRESSURE: 108 MMHG | RESPIRATION RATE: 16 BRPM

## 2020-01-14 PROCEDURE — G8482 FLU IMMUNIZE ORDER/ADMIN: HCPCS | Performed by: INTERNAL MEDICINE

## 2020-01-14 PROCEDURE — 1036F TOBACCO NON-USER: CPT | Performed by: INTERNAL MEDICINE

## 2020-01-14 PROCEDURE — 1123F ACP DISCUSS/DSCN MKR DOCD: CPT | Performed by: INTERNAL MEDICINE

## 2020-01-14 PROCEDURE — G8427 DOCREV CUR MEDS BY ELIG CLIN: HCPCS | Performed by: INTERNAL MEDICINE

## 2020-01-14 PROCEDURE — G8417 CALC BMI ABV UP PARAM F/U: HCPCS | Performed by: INTERNAL MEDICINE

## 2020-01-14 PROCEDURE — 99213 OFFICE O/P EST LOW 20 MIN: CPT | Performed by: INTERNAL MEDICINE

## 2020-01-14 PROCEDURE — 4040F PNEUMOC VAC/ADMIN/RCVD: CPT | Performed by: INTERNAL MEDICINE

## 2020-01-14 RX ORDER — GLIMEPIRIDE 4 MG/1
4 TABLET ORAL 2 TIMES DAILY
COMMUNITY
Start: 2020-01-02

## 2020-01-14 RX ORDER — RIVAROXABAN 15 MG/1
15 TABLET, FILM COATED ORAL 2 TIMES DAILY WITH MEALS
COMMUNITY
Start: 2019-12-31 | End: 2020-10-21 | Stop reason: ALTCHOICE

## 2020-01-14 ASSESSMENT — ENCOUNTER SYMPTOMS
SHORTNESS OF BREATH: 1
BACK PAIN: 0
WHEEZING: 0
CHEST TIGHTNESS: 0
APNEA: 0
ABDOMINAL PAIN: 0
CHOKING: 0
RHINORRHEA: 0
DIARRHEA: 0
ABDOMINAL DISTENTION: 0
COUGH: 0
VOICE CHANGE: 0
BLOOD IN STOOL: 0
STRIDOR: 0
CONSTIPATION: 0
SORE THROAT: 0
ANAL BLEEDING: 0
SINUS PRESSURE: 0

## 2020-01-14 NOTE — PROGRESS NOTES
Psychiatric/Behavioral: Negative for sleep disturbance. Vitals:    01/14/20 1414   BP: (!) 108/54   Pulse: 65   Resp: 16   SpO2: 95%   Weight: 199 lb (90.3 kg)     Body mass index is 28.55 kg/m². Wt Readings from Last 3 Encounters:   01/14/20 199 lb (90.3 kg)   12/04/19 194 lb (88 kg)   09/04/19 193 lb 11.2 oz (87.9 kg)     BP Readings from Last 3 Encounters:   01/14/20 (!) 108/54   12/04/19 118/62   09/04/19 (!) 178/88         Physical Exam  Constitutional:       General: He is not in acute distress. Appearance: He is well-developed. He is not diaphoretic. HENT:      Mouth/Throat:      Pharynx: No oropharyngeal exudate. Cardiovascular:      Rate and Rhythm: Normal rate and regular rhythm. Heart sounds: Normal heart sounds. No murmur. Pulmonary:      Effort: No respiratory distress. Breath sounds: Normal breath sounds. No wheezing or rales. Chest:      Chest wall: No tenderness. Abdominal:      General: There is no distension. Palpations: There is no mass. Tenderness: There is no tenderness. There is no guarding or rebound. Musculoskeletal:         General: No swelling, tenderness or deformity. Skin:     Coloration: Skin is not pale. Findings: No erythema or rash. Neurological:      Mental Status: He is alert and oriented to person, place, and time. Cranial Nerves: No cranial nerve deficit. Motor: No abnormal muscle tone. Coordination: Coordination normal.      Deep Tendon Reflexes: Reflexes normal.             Health Maintenance   Topic Date Due    Lipid screen  07/15/1955    DTaP/Tdap/Td vaccine (1 - Tdap) 07[birth year]/1954    Shingles Vaccine (1 of 2) 07/15/1993    Pneumococcal 65+ years Vaccine (1 of 1 - PPSV23) 07/15/2008    Potassium monitoring  04/24/2019    Creatinine monitoring  04/24/2019    Annual Wellness Visit (AWV)  06/23/2019    Flu vaccine  Completed          Assessment/Plan:     Diagnosis Orders   1.  Multiple subsegmental

## 2020-02-24 RX ORDER — NIFEDIPINE 30 MG/1
TABLET, EXTENDED RELEASE ORAL
Qty: 90 TABLET | Refills: 2 | Status: SHIPPED | OUTPATIENT
Start: 2020-02-24

## 2020-03-05 NOTE — PROGRESS NOTES
scleral icterus. · ENT: No Headaches, hearing loss or vertigo. No mouth sores or sore throat. · Cardiovascular: Reviewed in HPI  · Respiratory: No cough or wheezing, no sputum production. No hematemesis. · Gastrointestinal: No abdominal pain, appetite loss, blood in stools. No change in bowel or bladder habits. · Genitourinary: No dysuria, trouble voiding, or hematuria. · Musculoskeletal:  No gait disturbance, weakness or joint complaints. · Integumentary: No rash or pruritis. · Neurological: No headache, diplopia, change in muscle strength, numbness or tingling. No change in gait, balance, coordination, mood, affect, memory, mentation, behavior. · Psychiatric: No anxiety, no depression. · Endocrine: No malaise, fatigue or temperature intolerance. No excessive thirst, fluid intake, or urination. No tremor. · Hematologic/Lymphatic: No abnormal bruising or bleeding, blood clots or swollen lymph nodes. · Allergic/Immunologic: No nasal congestion or hives. Physical Examination:    Blood pressure 130/80, pulse 64, height 5' 10\" (1.778 m), weight 199 lb 8 oz (90.5 kg), SpO2 95 %. Constitutional and General Appearance: NAD  Skin:good turgor,intact without lesions  HEENT: EOMI ,normal  Neck:no JVD    Respiratory:  · Normal excursion and expansion without use of accessory muscles  · Resp Auscultation: Normal breath sounds without dullness  Cardiovascular:  · The apical impulses not displaced  · Heart tones are crisp and normal  · Cervical veins are not engorged  · The carotid upstroke is normal in amplitude and contour without delay --positive for bruit  · Peripheral pulses are symmetrical and full. · There is no clubbing, cyanosis of the extremities.   · No edema  · Femoral Arteries: 2+ and equal  · Pedal Pulses: 2+ and equal   Abdomen:  · No masses or tenderness  · Liver/Spleen: No Abnormalities Noted  Neurological/Psychiatric:  · Alert and oriented in all spheres  · Moves all extremities well  · Exhibits normal gait balance and coordination  · No abnormalities of mood, affect, memory, mentation, or behavior are noted    Chest CT w/cx 12/13/2019 ([facility])  Small bilateral peripheral pulmonary emboli, greatest in the lung bases.  No   saddle pulmonary embolus.       Scattered bandlike opacities throughout the lungs.  Bandlike morphology   favors atelectasis or scarring rather than pneumonia       Coronary artery disease       Cholelithiasis     ECHO 7/25/2019 ([facility])  1. Left ventricle: The cavity size was normal. Wall thickness was   normal. Systolic function was normal. The estimated ejection fraction was in the range of 55% to 60%. Wall motion was normal; there were no regional wall motion abnormalities. Features are consistent with a pseudonormal left ventricular filling pattern, with concomitant abnormal relaxation and increased filling pressure (grade 2 diastolic dysfunction). Global longitudinal  strain rate of -18.40%. The longitudal strain study is borderline abnormal.  2. Right ventricle: The cavity size was normal. Wall thickness was   normal. Systolic function was normal.  3. Tricuspid valve: The tricuspid jet envelope definition was   inadequate to estimate right ventricular systolic pressure. 4. Pulmonary arteries: Systolic pressure could not be accurately   estimated. Stress echo 8/13/18  Normal stress ECHO. -Normal global systolic function with an ejection fraction estimated at 60%.  -No regional wall motion abnormalities noted.   -Mild concentric left ventricular hypertrophy.   -Normal diastolic function. E/e'=8.15    Stress echo 7/10/17:  Normal stress echo  Summary   -Normal left ventricle size and systolic function with an estimated ejection fraction of 55-60%.    -No regional wall motion abnormalities are seen.   -Mild concentric left ventricular hypertrophy is present.   -There is reversal of E/A inflow velocities across the mitral valve.   -Diastolic filling parameters

## 2020-03-11 ENCOUNTER — OFFICE VISIT (OUTPATIENT)
Dept: CARDIOLOGY CLINIC | Age: 75
End: 2020-03-11
Payer: MEDICARE

## 2020-03-11 VITALS
HEIGHT: 70 IN | WEIGHT: 199.5 LBS | DIASTOLIC BLOOD PRESSURE: 80 MMHG | HEART RATE: 64 BPM | OXYGEN SATURATION: 95 % | BODY MASS INDEX: 28.56 KG/M2 | SYSTOLIC BLOOD PRESSURE: 130 MMHG

## 2020-03-11 PROCEDURE — G8482 FLU IMMUNIZE ORDER/ADMIN: HCPCS | Performed by: INTERNAL MEDICINE

## 2020-03-11 PROCEDURE — 1036F TOBACCO NON-USER: CPT | Performed by: INTERNAL MEDICINE

## 2020-03-11 PROCEDURE — 1123F ACP DISCUSS/DSCN MKR DOCD: CPT | Performed by: INTERNAL MEDICINE

## 2020-03-11 PROCEDURE — G8427 DOCREV CUR MEDS BY ELIG CLIN: HCPCS | Performed by: INTERNAL MEDICINE

## 2020-03-11 PROCEDURE — 4040F PNEUMOC VAC/ADMIN/RCVD: CPT | Performed by: INTERNAL MEDICINE

## 2020-03-11 PROCEDURE — G8417 CALC BMI ABV UP PARAM F/U: HCPCS | Performed by: INTERNAL MEDICINE

## 2020-03-11 PROCEDURE — 99214 OFFICE O/P EST MOD 30 MIN: CPT | Performed by: INTERNAL MEDICINE

## 2020-06-09 ENCOUNTER — HOSPITAL ENCOUNTER (OUTPATIENT)
Dept: NON INVASIVE DIAGNOSTICS | Age: 75
Discharge: HOME | End: 2020-06-09
Payer: MEDICARE

## 2020-06-09 LAB
LEFT VENTRICULAR EJECTION FRACTION HIGH VALUE: 60 %
LEFT VENTRICULAR EJECTION FRACTION MODE: NORMAL
LV EF: 55 %
LV EF: 58 %
LVEF MODALITY: NORMAL

## 2020-06-09 PROCEDURE — 93306 TTE W/DOPPLER COMPLETE: CPT

## 2020-06-10 ENCOUNTER — TELEPHONE (OUTPATIENT)
Dept: CARDIOLOGY CLINIC | Age: 75
End: 2020-06-10

## 2020-06-10 NOTE — TELEPHONE ENCOUNTER
Notes recorded by Alejandra Ho MD on 6/10/2020 at 7:29 AM EDT  Let him know echo looks good  Left a detailed message for patient about his test results.

## 2020-06-12 RX ORDER — CARVEDILOL 12.5 MG/1
TABLET ORAL
Qty: 180 TABLET | Refills: 3 | Status: SHIPPED | OUTPATIENT
Start: 2020-06-12 | End: 2021-02-05

## 2020-06-30 ENCOUNTER — OFFICE VISIT (OUTPATIENT)
Dept: PULMONOLOGY | Age: 75
End: 2020-06-30
Payer: MEDICARE

## 2020-06-30 VITALS
HEIGHT: 70 IN | OXYGEN SATURATION: 98 % | WEIGHT: 199 LBS | BODY MASS INDEX: 28.49 KG/M2 | DIASTOLIC BLOOD PRESSURE: 78 MMHG | SYSTOLIC BLOOD PRESSURE: 132 MMHG | HEART RATE: 61 BPM

## 2020-06-30 PROCEDURE — 4040F PNEUMOC VAC/ADMIN/RCVD: CPT | Performed by: INTERNAL MEDICINE

## 2020-06-30 PROCEDURE — 1036F TOBACCO NON-USER: CPT | Performed by: INTERNAL MEDICINE

## 2020-06-30 PROCEDURE — G8417 CALC BMI ABV UP PARAM F/U: HCPCS | Performed by: INTERNAL MEDICINE

## 2020-06-30 PROCEDURE — 1123F ACP DISCUSS/DSCN MKR DOCD: CPT | Performed by: INTERNAL MEDICINE

## 2020-06-30 PROCEDURE — G8427 DOCREV CUR MEDS BY ELIG CLIN: HCPCS | Performed by: INTERNAL MEDICINE

## 2020-06-30 PROCEDURE — 99213 OFFICE O/P EST LOW 20 MIN: CPT | Performed by: INTERNAL MEDICINE

## 2020-06-30 ASSESSMENT — ENCOUNTER SYMPTOMS
SINUS PRESSURE: 0
BLOOD IN STOOL: 0
ANAL BLEEDING: 0
COUGH: 0
VOICE CHANGE: 0
ABDOMINAL PAIN: 0
SHORTNESS OF BREATH: 1
ABDOMINAL DISTENTION: 0
CONSTIPATION: 0
APNEA: 0
WHEEZING: 0
CHEST TIGHTNESS: 0
BACK PAIN: 0
DIARRHEA: 0
STRIDOR: 0
SORE THROAT: 0
CHOKING: 0
RHINORRHEA: 0

## 2020-07-21 ENCOUNTER — OFFICE VISIT (OUTPATIENT)
Dept: PRIMARY CARE CLINIC | Age: 75
End: 2020-07-21
Payer: MEDICARE

## 2020-07-21 PROCEDURE — 99211 OFF/OP EST MAY X REQ PHY/QHP: CPT | Performed by: NURSE PRACTITIONER

## 2020-07-21 NOTE — PATIENT INSTRUCTIONS
Steps to help prevent the spread of COVID-19 if you are sick  SOURCE - [email or web address]-swanson.[email or web address] html     Stay home except to get medical care   ; Stay home: People who are mildly ill with COVID-19 are able to isolate at home during their illness. You should restrict activities outside your home, except for getting medical care.   ; Avoid public areas: Do not go to work, school, or public areas.   ; Avoid public transportation: Avoid using public transportation, ride-sharing, or taxis.  ; Separate yourself from other people and animals in your home   ; Stay away from others: As much as possible, you should stay in a specific room and away from other people in your home. Also, you should use a separate bathroom, if available.   ; Limit contact with pets & animals: You should restrict contact with pets and other animals while you are sick with COVID-19, just like you would around other people. Although there have not been reports of pets or other animals becoming sick with COVID-19, it is still recommended that people sick with COVID-19 limit contact with animals until more information is known about the virus. ; When possible, have another member of your household care for your animals while you are sick. If you are sick with COVID-19, avoid contact with your pet, including petting, snuggling, being kissed or licked, and sharing food. If you must care for your pet or be around animals while you are sick, wash your hands before and after you interact with pets and wear a facemask. See COVID-19 and Animals for more information. Other considerations   The ill person should eat/be fed in their room if possible. Non-disposable  items used should be handled with gloves and washed with hot water or in a . Clean hands after handling used  items.  If possible, dedicate a lined trash can for the ill person.  Use gloves when removing garbage bags, handling, and disposing of trash. Wash hands after handling or disposing of trash.  Consider consulting with your local health department about trash disposal guidance if available. Information for Household Members and Caregivers of Someone who is Sick   Call ahead before visiting your doctor   Call ahead: If you have a medical appointment, call the healthcare provider and tell them that you have or may have COVID-19. This will help the healthcare provider's office take steps to keep other people from getting infected or exposed. Wear a facemask if you are sick   ; If you are sick: You should wear a facemask when you are around other people (e.g., sharing a room or vehicle) or pets and before you enter a healthcare provider's office. ; If you are caring for others: If the person who is sick is not able to wear a facemask (for example, because it causes trouble breathing), then people who live with the person who is sick should not stay in the same room with them, or they should wear a facemask if they enter a room with the person who is sick. Cover your coughs and sneezes   ; Cover: Cover your mouth and nose with a tissue when you cough or sneeze.   ; Dispose: Throw used tissues in a lined trash can.   ; Wash hands: Immediately wash your hands with soap and water for at least 20 seconds or, if soap and water are not available, clean your hands with an alcohol-based hand  that contains at least 60% alcohol. Clean your hands often   ;  Wash hands: Wash your hands often with soap and water for at least 20 seconds, especially after blowing your nose, coughing, or sneezing; going to the bathroom; and before eating or preparing food.   ; Hand : If soap and water are not readily available, use an alcohol-based hand  with at least 60% alcohol, covering all surfaces of your hands and rubbing them together until they feel dry.   ; Soap and water: Soap and water are the best option if hands are visibly dirty.   ; Avoid touching: Avoid touching your eyes, nose, and mouth with unwashed hands. Handwashing Tips   ; Wet your hands with clean, running water (warm or cold), turn off the tap, and apply soap.  ; Lather your hands by rubbing them together with the soap. Lather the backs of your hands, between your fingers, and under your nails. ; Scrub your hands for at least 20 seconds. Need a timer? Hum the [place] from beginning to end twice.  ; Rinse your hands well under clean, running water.  ; Dry your hands using a clean towel or air dry them. Avoid sharing personal household items   ; Do not share: You should not share dishes, drinking glasses, cups, eating utensils, towels, or bedding with other people or pets in your home.   ; Wash thoroughly after use: After using these items, they should be washed thoroughly with soap and water. Clean all high-touch surfaces everyday   ; Clean and disinfect: Practice routine cleaning of high touch surfaces.  ; High touch surfaces include counters, tabletops, doorknobs, bathroom fixtures, toilets, phones, keyboards, tablets, and bedside tables.  ; Disinfect areas with bodily fluids: Also, clean any surfaces that may have blood, stool, or body fluids on them.   ; Household : Use a household cleaning spray or wipe, according to the label instructions. Labels contain instructions for safe and effective use of the cleaning product including precautions you should take when applying the product, such as wearing gloves and making sure you have good ventilation during use of the product.     Monitor your symptoms   Seek medical attention: Seek prompt medical attention if your illness is worsening     (e.g., difficulty breathing).   ; Call your doctor: Before seeking care, call your healthcare provider and tell them that you have, or are being evaluated for, COVID-19.   ; Wear a facemask when sick: Put on a facemask before you enter the facility. These steps will help the healthcare provider's office to keep other people in the office or waiting room from getting infected or exposed. ; Alert health department: Ask your healthcare provider to call the local or state health department. Persons who are placed under active monitoring or facilitated self-monitoring should follow instructions provided by their local health department or occupational health professionals, as appropriate.  ; Call 911 if you have a medical emergency: If you have a medical emergency and need to call 911, notify the dispatch personnel that you have, or are being evaluated for COVID-19. If possible, put on a facemask before emergency medical services arrive. Thank you for enrolling in [address] [facility]. Please follow the instructions below to securely access your online medical record. [employer] allows you to send messages to your doctor, view your test results, renew your prescriptions, schedule appointments, and more. How Do I Sign Up? 1. In your Internet browser, go to [email or web address]  2. Click on the Sign Up Now link in the Sign In box. You will see the New Member Sign Up page. 3. Enter your [employer] Access Code exactly as it appears below. You will not need to use this code after youve completed the sign-up process. If you do not sign up before the expiration date, you must request a new code. MyChart Access Code: Activation code not generated  Current [employer] Status: Patient Declined    4. Enter your Social Security Number ([id number]) and Date of Birth (mm/dd/yyyy) as indicated and click Submit. You will be taken to the next sign-up page. 5. Create a [employer] ID. This will be your [employer] login ID and cannot be changed, so think of one that is secure and easy to remember. 6. Create a [employer] password. You can change your password at any time. 7. Enter your Password Reset Question and Answer.  This can be used at a later time if you forget your password. 8. Enter your e-mail address. You will receive e-mail notification when new information is available in [address] 9. Click Sign Up. You can now view your medical record. Additional Information  If you have questions, please contact your physician practice where you receive care. Remember, MyChart is NOT to be used for urgent needs. For medical emergencies, dial 911.

## 2020-07-27 ENCOUNTER — HOSPITAL ENCOUNTER (OUTPATIENT)
Dept: PULMONOLOGY | Age: 75
Discharge: HOME | End: 2020-07-27
Payer: MEDICARE

## 2020-07-27 VITALS — HEART RATE: 63 BPM | RESPIRATION RATE: 16 BRPM | OXYGEN SATURATION: 96 %

## 2020-07-27 LAB
DLCO %PRED: 101 %
DLCO PRED: NORMAL
DLCO/VA %PRED: NORMAL
DLCO/VA PRED: NORMAL
DLCO/VA: NORMAL
DLCO: NORMAL
EXPIRATORY TIME-POST: NORMAL
EXPIRATORY TIME: NORMAL
FEF 25-75% %CHNG: NORMAL
FEF 25-75% %PRED-POST: NORMAL
FEF 25-75% %PRED-PRE: NORMAL
FEF 25-75% PRED: NORMAL
FEF 25-75%-POST: NORMAL
FEF 25-75%-PRE: NORMAL
FEV1 %PRED-POST: 77 %
FEV1 %PRED-PRE: 73 %
FEV1 PRED: NORMAL
FEV1-POST: NORMAL
FEV1-PRE: NORMAL
FEV1/FVC %PRED-POST: NORMAL
FEV1/FVC %PRED-PRE: NORMAL
FEV1/FVC PRED: NORMAL
FEV1/FVC-POST: 105 %
FEV1/FVC-PRE: 99 %
FVC %PRED-POST: NORMAL
FVC %PRED-PRE: NORMAL
FVC PRED: NORMAL
FVC-POST: NORMAL
FVC-PRE: NORMAL
GAW %PRED: NORMAL
GAW PRED: NORMAL
GAW: NORMAL
IC %PRED: NORMAL
IC PRED: NORMAL
IC: NORMAL
MEP: NORMAL
MIP: NORMAL
MVV %PRED-PRE: NORMAL
MVV PRED: NORMAL
MVV-PRE: NORMAL
PEF %PRED-POST: NORMAL
PEF %PRED-PRE: NORMAL
PEF PRED: NORMAL
PEF%CHNG: NORMAL
PEF-POST: NORMAL
PEF-PRE: NORMAL
RAW %PRED: NORMAL
RAW PRED: NORMAL
RAW: NORMAL
RV %PRED: NORMAL
RV PRED: NORMAL
RV: NORMAL
SARS-COV-2: NOT DETECTED
SOURCE: NORMAL
SVC %PRED: NORMAL
SVC PRED: NORMAL
SVC: NORMAL
TLC %PRED: 111 %
TLC PRED: NORMAL
TLC: NORMAL
VA %PRED: NORMAL
VA PRED: NORMAL
VA: NORMAL
VTG %PRED: NORMAL
VTG PRED: NORMAL
VTG: NORMAL

## 2020-07-27 PROCEDURE — 94060 EVALUATION OF WHEEZING: CPT

## 2020-07-27 PROCEDURE — 94200 LUNG FUNCTION TEST (MBC/MVV): CPT

## 2020-07-27 PROCEDURE — 94726 PLETHYSMOGRAPHY LUNG VOLUMES: CPT

## 2020-07-27 PROCEDURE — 94760 N-INVAS EAR/PLS OXIMETRY 1: CPT

## 2020-07-27 PROCEDURE — 6370000000 HC RX 637 (ALT 250 FOR IP): Performed by: INTERNAL MEDICINE

## 2020-07-27 PROCEDURE — 94729 DIFFUSING CAPACITY: CPT

## 2020-07-27 RX ORDER — ALBUTEROL SULFATE 90 UG/1
4 AEROSOL, METERED RESPIRATORY (INHALATION) ONCE
Status: COMPLETED | OUTPATIENT
Start: 2020-07-27 | End: 2020-07-27

## 2020-07-27 RX ADMIN — Medication 4 PUFF: at 16:39

## 2020-07-27 ASSESSMENT — PULMONARY FUNCTION TESTS
FEV1_PERCENT_PREDICTED_POST: 77
FEV1/FVC_PRE: 99
FEV1/FVC_POST: 105
FEV1_PERCENT_PREDICTED_PRE: 73

## 2020-08-06 ENCOUNTER — TELEPHONE (OUTPATIENT)
Dept: PULMONOLOGY | Age: 75
End: 2020-08-06

## 2020-08-07 NOTE — TELEPHONE ENCOUNTER
Pt informed of the results and the pt stated that he is not having any issues breathing - the pt stated that even if he did he was not going to take any medication for it anyway - the pt will talk to Dr Isabel Jimenes at his next appt

## 2020-10-12 PROBLEM — I26.99 OTHER PULMONARY EMBOLISM WITHOUT ACUTE COR PULMONALE (HCC): Status: ACTIVE | Noted: 2020-10-12

## 2020-10-12 NOTE — PROGRESS NOTES
all spheres  · Moves all extremities well  · Exhibits normal gait balance and coordination  · No abnormalities of mood, affect, memory, mentation, or behavior are noted    Chest CT w/cx 12/13/2019 ([facility])  Small bilateral peripheral pulmonary emboli, greatest in the lung bases.  No   saddle pulmonary embolus.       Scattered bandlike opacities throughout the lungs.  Bandlike morphology   favors atelectasis or scarring rather than pneumonia       Coronary artery disease       Cholelithiasis     ECHO 7/25/2019 ([facility])  1. Left ventricle: The cavity size was normal. Wall thickness was   normal. Systolic function was normal. The estimated ejection fraction was in the range of 55% to 60%. Wall motion was normal; there were no regional wall motion abnormalities. Features are consistent with a pseudonormal left ventricular filling pattern, with concomitant abnormal relaxation and increased filling pressure (grade 2 diastolic dysfunction). Global longitudinal  strain rate of -18.40%. The longitudal strain study is borderline abnormal.  2. Right ventricle: The cavity size was normal. Wall thickness was   normal. Systolic function was normal.  3. Tricuspid valve: The tricuspid jet envelope definition was   inadequate to estimate right ventricular systolic pressure. 4. Pulmonary arteries: Systolic pressure could not be accurately   estimated. Stress echo 8/13/18  Normal stress ECHO. -Normal global systolic function with an ejection fraction estimated at 60%.  -No regional wall motion abnormalities noted.   -Mild concentric left ventricular hypertrophy.   -Normal diastolic function. E/e'=8.15    Stress echo 7/10/17:  Normal stress echo  Summary   -Normal left ventricle size and systolic function with an estimated ejection fraction of 55-60%.    -No regional wall motion abnormalities are seen.   -Mild concentric left ventricular hypertrophy is present.   -There is reversal of E/A inflow velocities across the mitral valve.   -Diastolic filling parameters suggests grade I diastolic dysfunction . E/e'=12.3 .   -No evidence of significant valvular insufficiency. GXT Myoview 7/2016  Summary    Small sized inferior fixed defect consistent with diaphragmatic artifact in    the territory of the mid and distal RCA .    Normal LV size and systolic function. Assessment:  1. Coronary artery disease: Stable. No anginal syptoms. 2005 Cath> Cypher AARON to the LAD.   8/2013 GXT Shant> normal perfusion/EF 78%, +CHAIREZ with exercise  7/2016 GXT Shant>small sized inferior fixed defect consistent with diaphragmatic artifact in the territory of the mid and distal RCA, normal LV size and function     2. Essential hypertension, benign: Stable. Blood pressure (!) 142/80, pulse 66, height 5' 10\" (1.778 m), weight 206 lb 1.6 oz (93.5 kg), SpO2 96 %. Continue nifedipine 30mg nightly & PRN in the am if systolic above 954, BP log reviewed. 3. Other and unspecified hyperlipidemia:  Stable. Managed per PCP.   7/25/19> , , HDL 35, LDL 43    4. Carotid stenosis: Stable, doppler 6/18> <50% bilateral    5. Pulmonary emboli: Noted on CT 12/2019, started taking Xarelto. Follows with  Dr. Kristine Gomez:  BP log reviewed and discussed. All cardiac test and lab results personally reviewed by me during this office visit. 1. No med changes   2. Labs per PCP- to be done soon. 3. Return for regular follow up in 6 months    I appreciate the opportunity of cooperating in the care of this individual.    Tyson Orozco. Lashon Harley M.D., [facility]    Patient's problem list, medications, allergies, past medical, surgical, social and family histories were reviewed and updated as appropriate. Scribe's attestation: This note was scribed in the presence of Dr. Lashon Harley MD, by Samara Medellin RN. The scribe's documentation has been prepared under my direction and personally reviewed by me in its entirety.  I confirm that the note above accurately reflects all work, treatment, procedures, and medical decision making performed by me.

## 2020-10-21 ENCOUNTER — OFFICE VISIT (OUTPATIENT)
Dept: CARDIOLOGY CLINIC | Age: 75
End: 2020-10-21
Payer: MEDICARE

## 2020-10-21 VITALS
OXYGEN SATURATION: 96 % | SYSTOLIC BLOOD PRESSURE: 142 MMHG | BODY MASS INDEX: 29.51 KG/M2 | HEIGHT: 70 IN | WEIGHT: 206.1 LBS | HEART RATE: 66 BPM | DIASTOLIC BLOOD PRESSURE: 80 MMHG

## 2020-10-21 PROCEDURE — 4040F PNEUMOC VAC/ADMIN/RCVD: CPT | Performed by: INTERNAL MEDICINE

## 2020-10-21 PROCEDURE — 99214 OFFICE O/P EST MOD 30 MIN: CPT | Performed by: INTERNAL MEDICINE

## 2020-10-21 PROCEDURE — 1123F ACP DISCUSS/DSCN MKR DOCD: CPT | Performed by: INTERNAL MEDICINE

## 2020-10-21 PROCEDURE — G8484 FLU IMMUNIZE NO ADMIN: HCPCS | Performed by: INTERNAL MEDICINE

## 2020-10-21 PROCEDURE — G8417 CALC BMI ABV UP PARAM F/U: HCPCS | Performed by: INTERNAL MEDICINE

## 2020-10-21 PROCEDURE — G8427 DOCREV CUR MEDS BY ELIG CLIN: HCPCS | Performed by: INTERNAL MEDICINE

## 2020-10-21 PROCEDURE — 1036F TOBACCO NON-USER: CPT | Performed by: INTERNAL MEDICINE

## 2020-10-27 RX ORDER — DOXAZOSIN 8 MG/1
TABLET ORAL
Qty: 180 TABLET | Refills: 3 | Status: SHIPPED | OUTPATIENT
Start: 2020-10-27 | End: 2021-08-16

## 2020-10-27 NOTE — TELEPHONE ENCOUNTER
Received refill request for Cardura from [facility].      Last OV: 10/21/2020    Last Refill: 09/04/2019 #180 w/ 3 refiils    Next Appointment: 04/19/2021

## 2021-01-04 ENCOUNTER — OFFICE VISIT (OUTPATIENT)
Dept: PULMONOLOGY | Age: 76
End: 2021-01-04
Payer: MEDICARE

## 2021-01-04 VITALS
SYSTOLIC BLOOD PRESSURE: 142 MMHG | HEART RATE: 65 BPM | OXYGEN SATURATION: 99 % | BODY MASS INDEX: 29.49 KG/M2 | WEIGHT: 206 LBS | HEIGHT: 70 IN | DIASTOLIC BLOOD PRESSURE: 72 MMHG

## 2021-01-04 DIAGNOSIS — J44.9 COPD, MODERATE (HCC): Primary | ICD-10-CM

## 2021-01-04 DIAGNOSIS — Z86.711 HISTORY OF PULMONARY EMBOLISM: ICD-10-CM

## 2021-01-04 PROCEDURE — 1123F ACP DISCUSS/DSCN MKR DOCD: CPT | Performed by: INTERNAL MEDICINE

## 2021-01-04 PROCEDURE — 4040F PNEUMOC VAC/ADMIN/RCVD: CPT | Performed by: INTERNAL MEDICINE

## 2021-01-04 PROCEDURE — 99213 OFFICE O/P EST LOW 20 MIN: CPT | Performed by: INTERNAL MEDICINE

## 2021-01-04 PROCEDURE — G8484 FLU IMMUNIZE NO ADMIN: HCPCS | Performed by: INTERNAL MEDICINE

## 2021-01-04 PROCEDURE — 1036F TOBACCO NON-USER: CPT | Performed by: INTERNAL MEDICINE

## 2021-01-04 PROCEDURE — G8417 CALC BMI ABV UP PARAM F/U: HCPCS | Performed by: INTERNAL MEDICINE

## 2021-01-04 PROCEDURE — G8427 DOCREV CUR MEDS BY ELIG CLIN: HCPCS | Performed by: INTERNAL MEDICINE

## 2021-01-04 PROCEDURE — 3023F SPIROM DOC REV: CPT | Performed by: INTERNAL MEDICINE

## 2021-01-04 PROCEDURE — G8926 SPIRO NO PERF OR DOC: HCPCS | Performed by: INTERNAL MEDICINE

## 2021-01-04 RX ORDER — FUROSEMIDE 20 MG/1
10 TABLET ORAL DAILY
COMMUNITY

## 2021-01-04 ASSESSMENT — ENCOUNTER SYMPTOMS
SINUS PRESSURE: 0
DIARRHEA: 0
WHEEZING: 0
COUGH: 0
BACK PAIN: 0
SORE THROAT: 0
ABDOMINAL DISTENTION: 0
VOICE CHANGE: 0
ANAL BLEEDING: 0
CONSTIPATION: 0
RHINORRHEA: 0
ABDOMINAL PAIN: 0
APNEA: 0
STRIDOR: 0
BLOOD IN STOOL: 0
CHOKING: 0
CHEST TIGHTNESS: 0
SHORTNESS OF BREATH: 0

## 2021-01-04 NOTE — PROGRESS NOTES
Georges Pardo    YOB: 1945     Date of Service:  1/4/2021     Chief Complaint   Patient presents with    Other     pulmonary embolism          HPI patient states that he has no significant dyspnea with exertion, uses albuterol inhaler only occasionally. No chest pain or cough. Issues with hypertension management, medications make him fatigued. No Known Allergies  Outpatient Medications Marked as Taking for the 1/4/21 encounter (Office Visit) with Mignon Tom MD   Medication Sig Dispense Refill    furosemide (LASIX) 40 MG tablet Take 40 mg by mouth daily      doxazosin (CARDURA) 8 MG tablet TAKE 1 TABLET BY MOUTH TWICE A  tablet 3    rivaroxaban (XARELTO) 20 MG TABS tablet Take 1 tablet by mouth daily (with breakfast) 90 tablet 1    carvedilol (COREG) 12.5 MG tablet TAKE 2 TABLETS BY MOUTH TWICE A  tablet 3    NIFEdipine (PROCARDIA XL) 30 MG extended release tablet TAKE 1 TABLET BY MOUTH NIGHTLY. TAKE AS NEEDED IN THE MORNING IF SYSTOLIC IS ABOVE 005 90 tablet 2    glimepiride (AMARYL) 2 MG tablet 2 times daily       lisinopril-hydrochlorothiazide (PRINZIDE;ZESTORETIC) 20-25 MG per tablet Take 1 tablet by mouth daily 30 tablet 3    atorvastatin (LIPITOR) 20 MG tablet Take 1 tablet by mouth daily. 30 tablet 11    aspirin 81 MG tablet Take 81 mg by mouth daily.            Immunization History   Administered Date(s) Administered    Influenza, High Dose (Fluzone 65 yrs and older) 09/23/2018, 10/01/2019       Past Medical History:   Diagnosis Date    CAD (coronary artery disease)     Hyperlipidemia     Hypertension     Screening for AAA (aortic abdominal aneurysm)     Sleep apnea     uses C-PAP     Past Surgical History:   Procedure Laterality Date    CORONARY ANGIOPLASTY WITH STENT PLACEMENT  2005    DIAGNOSTIC CARDIAC CATH LAB PROCEDURE      KNEE SURGERY  10/2015    [facility]     Family History   Problem Relation Age of Onset  Heart Attack Father     Heart Disease Father     High Blood Pressure Neg Hx     High Cholesterol Neg Hx     Kidney Disease Neg Hx        Review of Systems:  Review of Systems   Constitutional: Positive for fatigue. Negative for activity change, appetite change and fever. HENT: Negative for congestion, ear discharge, ear pain, postnasal drip, rhinorrhea, sinus pressure, sneezing, sore throat, tinnitus and voice change. Respiratory: Negative for apnea, cough, choking, chest tightness, shortness of breath, wheezing and stridor. Cardiovascular: Negative for chest pain, palpitations and leg swelling. Gastrointestinal: Negative for abdominal distention, abdominal pain, anal bleeding, blood in stool, constipation and diarrhea. Musculoskeletal: Negative for arthralgias, back pain and gait problem. Skin: Negative for pallor and rash. Allergic/Immunologic: Negative for environmental allergies. Neurological: Negative for dizziness, tremors, seizures, syncope, speech difficulty, weakness, light-headedness, numbness and headaches. Hematological: Negative for adenopathy. Does not bruise/bleed easily. Psychiatric/Behavioral: Negative for sleep disturbance. Vitals:    01/04/21 1338   BP: (!) 142/72   Site: Right Upper Arm   Position: Sitting   Cuff Size: Large Adult   Pulse: 65   SpO2: 99%   Weight: 206 lb (93.4 kg)   Height: 5' 10\" (1.778 m)     No flowsheet data found. Body mass index is 29.56 kg/m². Wt Readings from Last 3 Encounters:   01/04/21 206 lb (93.4 kg)   10/21/20 206 lb 1.6 oz (93.5 kg)   06/30/20 199 lb (90.3 kg)     BP Readings from Last 3 Encounters:   01/04/21 (!) 142/72   10/21/20 (!) 142/80   06/30/20 132/78         Physical Exam  Constitutional:       General: He is not in acute distress. Appearance: He is well-developed. He is not diaphoretic. HENT:      Mouth/Throat:      Pharynx: No oropharyngeal exudate.    Cardiovascular: Rate and Rhythm: Normal rate and regular rhythm. Heart sounds: Normal heart sounds. No murmur. Pulmonary:      Effort: No respiratory distress. Breath sounds: Normal breath sounds. No wheezing or rales. Chest:      Chest wall: No tenderness. Abdominal:      General: There is no distension. Palpations: There is no mass. Tenderness: There is no abdominal tenderness. There is no guarding or rebound. Musculoskeletal:         General: No swelling, tenderness or deformity. Skin:     Coloration: Skin is not pale. Findings: No erythema or rash. Neurological:      Mental Status: He is alert and oriented to person, place, and time. Cranial Nerves: No cranial nerve deficit. Motor: No abnormal muscle tone. Coordination: Coordination normal.      Deep Tendon Reflexes: Reflexes normal.             Health Maintenance   Topic Date Due    Hepatitis C screen  1945    Lipid screen  07/15/1955    DTaP/Tdap/Td vaccine (1 - Tdap) 07/15/1962    Pneumococcal 65+ years Vaccine (1 of 1 - PPSV23) 07/15/2008    Shingles Vaccine (2 of 3) 12/24/2015    Potassium monitoring  04/24/2019    Creatinine monitoring  04/24/2019    Annual Wellness Visit (AWV)  06/23/2019    Flu vaccine (1) 09/01/2020    Hepatitis A vaccine  Aged Out    Hepatitis B vaccine  Aged Out    Hib vaccine  Aged Out    Meningococcal (ACWY) vaccine  Aged Out          Assessment/Plan:    Unprovoked PE in December 2019 on lifelong anticoagulation with Xarelto which she is tolerating quite well. Moderate COPD, but remains asymptomatic-continue with albuterol inhaler only. Does not want to go on controller inhaler therapy. Patient does not qualify for low-dose CT imaging. Up to date with influenza and Pneumovax 23. Follow-up in 6 months.

## 2021-02-05 RX ORDER — CARVEDILOL 12.5 MG/1
TABLET ORAL
Qty: 360 TABLET | Refills: 1 | Status: SHIPPED | OUTPATIENT
Start: 2021-02-05 | End: 2021-11-29 | Stop reason: SDUPTHER

## 2021-02-05 NOTE — TELEPHONE ENCOUNTER
Received refill request for Carvedilol 12.5 mg from [facility].     Last OV: 10/21/20 LES    Last Labs: 4/12/20    Last Refill: 6/12/20 #180 with 3 refills    Next Appt: 4/19/21 LES

## 2021-03-16 NOTE — PROGRESS NOTES
There's been no change in energy level, sleep pattern, or activity level. · Eyes: No visual changes or diplopia. No scleral icterus. · ENT: No Headaches, hearing loss or vertigo. No mouth sores or sore throat. · Cardiovascular: Reviewed in HPI  · Respiratory: No cough or wheezing, no sputum production. No hematemesis. · Gastrointestinal: No abdominal pain, appetite loss, blood in stools. No change in bowel or bladder habits. · Genitourinary: No dysuria, trouble voiding, or hematuria. · Musculoskeletal:  No gait disturbance, weakness or joint complaints. · Integumentary: No rash or pruritis. · Neurological: No headache, diplopia, change in muscle strength, numbness or tingling. No change in gait, balance, coordination, mood, affect, memory, mentation, behavior. · Psychiatric: No anxiety, no depression. · Endocrine: No malaise, fatigue or temperature intolerance. No excessive thirst, fluid intake, or urination. No tremor. · Hematologic/Lymphatic: No abnormal bruising or bleeding, blood clots or swollen lymph nodes. · Allergic/Immunologic: No nasal congestion or hives. Physical Examination:    Blood pressure (!) 158/78, pulse 60, height 5' 10\" (1.778 m), weight 207 lb 3.2 oz (94 kg), SpO2 98 %. Constitutional and General Appearance: NAD  Skin:good turgor,intact without lesions  HEENT: EOMI ,normal  Neck:no JVD    Respiratory:  · Normal excursion and expansion without use of accessory muscles  · Resp Auscultation: Normal breath sounds without dullness  Cardiovascular:  · The apical impulses not displaced  · Heart tones are crisp and normal  · Cervical veins are not engorged  · The carotid upstroke is normal in amplitude and contour without delay --positive for bruit  · Peripheral pulses are symmetrical and full. · There is no clubbing, cyanosis of the extremities.   · No edema  · Femoral Arteries: 2+ and equal  · Pedal Pulses: 2+ and equal   Abdomen:  · No masses or tenderness  · Liver/Spleen: No Abnormalities Noted  Neurological/Psychiatric:  · Alert and oriented in all spheres  · Moves all extremities well  · Exhibits normal gait balance and coordination  · No abnormalities of mood, affect, memory, mentation, or behavior are noted    Chest CT w/cx 12/13/2019 ([facility])  Small bilateral peripheral pulmonary emboli, greatest in the lung bases.  No   saddle pulmonary embolus.       Scattered bandlike opacities throughout the lungs.  Bandlike morphology   favors atelectasis or scarring rather than pneumonia       Coronary artery disease       Cholelithiasis     ECHO 7/25/2019 ([facility])  1. Left ventricle: The cavity size was normal. Wall thickness was   normal. Systolic function was normal. The estimated ejection fraction was in the range of 55% to 60%. Wall motion was normal; there were no regional wall motion abnormalities. Features are consistent with a pseudonormal left ventricular filling pattern, with concomitant abnormal relaxation and increased filling pressure (grade 2 diastolic dysfunction). Global longitudinal  strain rate of -18.40%. The longitudal strain study is borderline abnormal.  2. Right ventricle: The cavity size was normal. Wall thickness was   normal. Systolic function was normal.  3. Tricuspid valve: The tricuspid jet envelope definition was   inadequate to estimate right ventricular systolic pressure. 4. Pulmonary arteries: Systolic pressure could not be accurately   estimated. Stress echo 8/13/18  Normal stress ECHO. -Normal global systolic function with an ejection fraction estimated at 60%.  -No regional wall motion abnormalities noted.   -Mild concentric left ventricular hypertrophy.   -Normal diastolic function. E/e'=8.15    Stress echo 7/10/17:  Normal stress echo  Summary   -Normal left ventricle size and systolic function with an estimated ejection fraction of 55-60%.    -No regional wall motion abnormalities are seen.   -Mild concentric left ventricular hypertrophy is present.   -There is reversal of E/A inflow velocities across the mitral valve.   -Diastolic filling parameters suggests grade I diastolic dysfunction . E/e'=12.3 .   -No evidence of significant valvular insufficiency. GXT Myoview 7/2016  Summary    Small sized inferior fixed defect consistent with diaphragmatic artifact in    the territory of the mid and distal RCA .    Normal LV size and systolic function. Assessment:  1. Coronary artery disease: Stable. No anginal syptoms. 2005 Cath> Cypher AARON to the LAD.   8/2013 GXT Shant> normal perfusion/EF 78%, +CHAIREZ with exercise  7/2016 GXT Shant>small sized inferior fixed defect consistent with diaphragmatic artifact in the territory of the mid and distal RCA, normal LV size and function     2. Hypertension, benign: Stable. Blood pressure (!) 158/78, pulse 60, height 5' 10\" (1.778 m), weight 207 lb 3.2 oz (94 kg), SpO2 98 %. Continue nifedipine 30mg nightly & PRN in the am if systolic above 571.      3. Other and unspecified hyperlipidemia:  Stable. Managed per PCP. 4.  Carotid stenosis: Stable, doppler 6/18> <50% bilateral    5. Pulmonary emboli: Noted on CT 12/2019, started taking Xarelto. Follows with  Dr. Green Parent:    All cardiac test and lab results personally reviewed by me during this office visit. 1. No med changes   2. Return for regular follow up in 6 months    I appreciate the opportunity of cooperating in the care of this individual.    Carlos Enrique Tom. Yogi Krishnan M.D., [address] [facility]    Patient's problem list, medications, allergies, past medical, surgical, social and family histories were reviewed and updated as appropriate. Scribe's attestation: This note was scribed in the presence of Dr. Yogi Krishnan MD, by Gwendloyn Ormond RN. The scribe's documentation has been prepared under my direction and personally reviewed by me in its entirety.  I confirm that the note above accurately reflects all work, treatment, procedures, and medical decision making

## 2021-03-17 ENCOUNTER — OFFICE VISIT (OUTPATIENT)
Dept: CARDIOLOGY CLINIC | Age: 76
End: 2021-03-17
Payer: MEDICARE

## 2021-03-17 VITALS
SYSTOLIC BLOOD PRESSURE: 158 MMHG | DIASTOLIC BLOOD PRESSURE: 78 MMHG | HEIGHT: 70 IN | BODY MASS INDEX: 29.66 KG/M2 | OXYGEN SATURATION: 98 % | HEART RATE: 60 BPM | WEIGHT: 207.2 LBS

## 2021-03-17 DIAGNOSIS — I25.10 CORONARY ARTERY DISEASE INVOLVING NATIVE CORONARY ARTERY OF NATIVE HEART WITHOUT ANGINA PECTORIS: Primary | ICD-10-CM

## 2021-03-17 DIAGNOSIS — E78.49 OTHER HYPERLIPIDEMIA: ICD-10-CM

## 2021-03-17 DIAGNOSIS — I10 ESSENTIAL HYPERTENSION: ICD-10-CM

## 2021-03-17 DIAGNOSIS — I26.99 OTHER PULMONARY EMBOLISM WITHOUT ACUTE COR PULMONALE, UNSPECIFIED CHRONICITY (HCC): ICD-10-CM

## 2021-03-17 DIAGNOSIS — I65.23 BILATERAL CAROTID ARTERY STENOSIS: ICD-10-CM

## 2021-03-17 PROCEDURE — G8427 DOCREV CUR MEDS BY ELIG CLIN: HCPCS | Performed by: INTERNAL MEDICINE

## 2021-03-17 PROCEDURE — 99214 OFFICE O/P EST MOD 30 MIN: CPT | Performed by: INTERNAL MEDICINE

## 2021-03-17 PROCEDURE — G8417 CALC BMI ABV UP PARAM F/U: HCPCS | Performed by: INTERNAL MEDICINE

## 2021-03-17 PROCEDURE — 4040F PNEUMOC VAC/ADMIN/RCVD: CPT | Performed by: INTERNAL MEDICINE

## 2021-03-17 PROCEDURE — 1036F TOBACCO NON-USER: CPT | Performed by: INTERNAL MEDICINE

## 2021-03-17 PROCEDURE — G8484 FLU IMMUNIZE NO ADMIN: HCPCS | Performed by: INTERNAL MEDICINE

## 2021-03-17 PROCEDURE — 1123F ACP DISCUSS/DSCN MKR DOCD: CPT | Performed by: INTERNAL MEDICINE

## 2021-07-12 ENCOUNTER — TELEPHONE (OUTPATIENT)
Dept: PULMONOLOGY | Age: 76
End: 2021-07-12

## 2021-07-26 ENCOUNTER — OFFICE VISIT (OUTPATIENT)
Dept: PULMONOLOGY | Age: 76
End: 2021-07-26
Payer: MEDICARE

## 2021-07-26 VITALS — HEART RATE: 58 BPM | DIASTOLIC BLOOD PRESSURE: 72 MMHG | SYSTOLIC BLOOD PRESSURE: 110 MMHG | OXYGEN SATURATION: 96 %

## 2021-07-26 DIAGNOSIS — Z86.711 HISTORY OF PULMONARY EMBOLISM: Primary | ICD-10-CM

## 2021-07-26 DIAGNOSIS — J44.9 COPD, MODERATE (HCC): ICD-10-CM

## 2021-07-26 PROCEDURE — G8427 DOCREV CUR MEDS BY ELIG CLIN: HCPCS | Performed by: INTERNAL MEDICINE

## 2021-07-26 PROCEDURE — 3023F SPIROM DOC REV: CPT | Performed by: INTERNAL MEDICINE

## 2021-07-26 PROCEDURE — G8417 CALC BMI ABV UP PARAM F/U: HCPCS | Performed by: INTERNAL MEDICINE

## 2021-07-26 PROCEDURE — 1123F ACP DISCUSS/DSCN MKR DOCD: CPT | Performed by: INTERNAL MEDICINE

## 2021-07-26 PROCEDURE — G8926 SPIRO NO PERF OR DOC: HCPCS | Performed by: INTERNAL MEDICINE

## 2021-07-26 PROCEDURE — 99213 OFFICE O/P EST LOW 20 MIN: CPT | Performed by: INTERNAL MEDICINE

## 2021-07-26 PROCEDURE — 4040F PNEUMOC VAC/ADMIN/RCVD: CPT | Performed by: INTERNAL MEDICINE

## 2021-07-26 PROCEDURE — 1036F TOBACCO NON-USER: CPT | Performed by: INTERNAL MEDICINE

## 2021-07-26 ASSESSMENT — ENCOUNTER SYMPTOMS
SORE THROAT: 0
CHOKING: 0
VOICE CHANGE: 0
ABDOMINAL PAIN: 0
APNEA: 0
STRIDOR: 0
COUGH: 0
BLOOD IN STOOL: 0
SINUS PRESSURE: 0
RHINORRHEA: 0
ANAL BLEEDING: 0
SHORTNESS OF BREATH: 0
BACK PAIN: 1
ABDOMINAL DISTENTION: 0
CHEST TIGHTNESS: 0
DIARRHEA: 0
WHEEZING: 0
CONSTIPATION: 0

## 2021-07-26 NOTE — PROGRESS NOTES
Piero Barahona    YOB: 1945     Date of Service:  7/26/2021     Chief Complaint   Patient presents with    Shortness of Breath     6 month          HPI patient states that he has been doing quite well, denies any dyspnea and states that he has not used albuterol inhaler in a while. Denies any cough or chest pain. Mostly has mobility related issues from arthritis which restricts his activity. No Known Allergies  Outpatient Medications Marked as Taking for the 7/26/21 encounter (Office Visit) with Shireen Cordova MD   Medication Sig Dispense Refill    esomeprazole (NEXIUM) 20 MG delayed release capsule Take by mouth as needed      carvedilol (COREG) 12.5 MG tablet TAKE 2 TABLETS BY MOUTH TWICE A  tablet 1    furosemide (LASIX) 40 MG tablet Take 20 mg by mouth daily       doxazosin (CARDURA) 8 MG tablet TAKE 1 TABLET BY MOUTH TWICE A  tablet 3    rivaroxaban (XARELTO) 20 MG TABS tablet Take 1 tablet by mouth daily (with breakfast) 90 tablet 1    NIFEdipine (PROCARDIA XL) 30 MG extended release tablet TAKE 1 TABLET BY MOUTH NIGHTLY. TAKE AS NEEDED IN THE MORNING IF SYSTOLIC IS ABOVE 442 90 tablet 2    glimepiride (AMARYL) 2 MG tablet 2 times daily       lisinopril-hydrochlorothiazide (PRINZIDE;ZESTORETIC) 20-25 MG per tablet Take 1 tablet by mouth daily 30 tablet 3    atorvastatin (LIPITOR) 20 MG tablet Take 1 tablet by mouth daily. 30 tablet 11    aspirin 81 MG tablet Take 81 mg by mouth daily.            Immunization History   Administered Date(s) Administered    Influenza, High Dose (Fluzone 65 yrs and older) 09/23/2018, 10/01/2019       Past Medical History:   Diagnosis Date    CAD (coronary artery disease)     Hyperlipidemia     Hypertension     Screening for AAA (aortic abdominal aneurysm)     Sleep apnea     uses C-PAP     Past Surgical History:   Procedure Laterality Date    CORONARY ANGIOPLASTY WITH STENT PLACEMENT  2005   [address] CATH LAB PROCEDURE      KNEE SURGERY  10/2015    [facility]     Family History   Problem Relation Age of Onset    Heart Attack Father     Heart Disease Father     High Blood Pressure Neg Hx     High Cholesterol Neg Hx     Kidney Disease Neg Hx        Review of Systems:  Review of Systems   Constitutional: Negative for activity change, appetite change, fatigue and fever. HENT: Negative for congestion, ear discharge, ear pain, postnasal drip, rhinorrhea, sinus pressure, sneezing, sore throat, tinnitus and voice change. Respiratory: Negative for apnea, cough, choking, chest tightness, shortness of breath, wheezing and stridor. Cardiovascular: Negative for chest pain, palpitations and leg swelling. Gastrointestinal: Negative for abdominal distention, abdominal pain, anal bleeding, blood in stool, constipation and diarrhea. Musculoskeletal: Positive for back pain and gait problem. Negative for arthralgias. Skin: Negative for pallor and rash. Allergic/Immunologic: Negative for environmental allergies. Neurological: Negative for dizziness, tremors, seizures, syncope, speech difficulty, weakness, light-headedness, numbness and headaches. Hematological: Negative for adenopathy. Does not bruise/bleed easily. Psychiatric/Behavioral: Negative for sleep disturbance. Vitals:    07/26/21 1028   BP: 110/72   Pulse: 58   SpO2: 96%     No flowsheet data found. There is no height or weight on file to calculate BMI. Wt Readings from Last 3 Encounters:   03/17/21 207 lb 3.2 oz (94 kg)   01/04/21 206 lb (93.4 kg)   10/21/20 206 lb 1.6 oz (93.5 kg)     BP Readings from Last 3 Encounters:   07/26/21 110/72   03/17/21 (!) 158/78   01/04/21 (!) 142/72         Physical Exam  Constitutional:       General: He is not in acute distress. Appearance: He is well-developed. He is not diaphoretic. HENT:      Mouth/Throat:      Pharynx: No oropharyngeal exudate.    Cardiovascular:      Rate and Rhythm: Normal rate and regular rhythm. Heart sounds: Normal heart sounds. No murmur heard. Pulmonary:      Effort: No respiratory distress. Breath sounds: Normal breath sounds. No wheezing or rales. Chest:      Chest wall: No tenderness. Abdominal:      General: There is no distension. Palpations: There is no mass. Tenderness: There is no abdominal tenderness. There is no guarding or rebound. Musculoskeletal:         General: No swelling, tenderness or deformity. Skin:     Coloration: Skin is not pale. Findings: No erythema or rash. Neurological:      Mental Status: He is alert and oriented to person, place, and time. Cranial Nerves: No cranial nerve deficit. Motor: No abnormal muscle tone. Coordination: Coordination normal.      Deep Tendon Reflexes: Reflexes normal.             Health Maintenance   Topic Date Due    Hepatitis C screen  Never done    Lipid screen  Never done    DTaP/Tdap/Td vaccine (1 - Tdap) Never done    Pneumococcal 65+ years Vaccine (1 of 1 - PPSV23) Never done    Shingles Vaccine (2 of 3) 12/24/2015    Potassium monitoring  04/24/2019    Creatinine monitoring  04/24/2019    Annual Wellness Visit (AWV)  Never done    Flu vaccine (1) 09/01/2021    COVID-19 Vaccine  Completed    Hepatitis A vaccine  Aged Out    Hepatitis B vaccine  Aged Out    Hib vaccine  Aged Out    Meningococcal (ACWY) vaccine  Aged Out          Assessment/Plan:     Diagnosis Orders   1. History of pulmonary embolism  CARDIOLIPIN ANTIBODIES IGG & IGM    FACTOR 5 LEIDEN    PROTHROMBIN GENE MUTATION    ANTITHROMBIN PANEL   2.  COPD, moderate    Unprovoked PE-small bilateral peripheral PE of unclear etiology in December 2019. Tolerating Xarelto which he will continue, plan for lifelong therapy.   Patient has not had hypercoagulable work-up done-which I have requested once again, patient wants to get these labs done either at PCP office or [facility].    COPD,

## 2021-08-16 RX ORDER — DOXAZOSIN 8 MG/1
TABLET ORAL
Qty: 180 TABLET | Refills: 3 | Status: SHIPPED | OUTPATIENT
Start: 2021-08-16 | End: 2021-09-29

## 2021-08-16 NOTE — TELEPHONE ENCOUNTER
Received refill request for Doxazosin 8 mg from [facility].     Last OV: 3/17/21 LES    Last Labs: 7/26/21    Last Refill: 10/27/20 #180 with 3 refills    Next Appt: 9/29/21 LES

## 2021-09-22 NOTE — PROGRESS NOTES
[facility]   Cardiac Follow-up     Referring Provider:  Melody Knott MD     Chief Complaint   Patient presents with    Coronary Artery Disease    Hypertension    6 Month Follow-Up     no cardio symptons     History of Present Illness:  Mr. Vikram Lamb is a 64 y.o. male who has a history of CAD, hypertension, hyperlipidemia, carotid stenosis. He tends to run low B/P. However, back in the summer 2019, he was admitted to [facility] hypertensive encephalopathy, had neuro symptoms affecting his speech; he was readmitted a few weeks later with transient confusion and expressive aphasia that resolved with hypertensive correction with IV labetalol. He was diagnosed with a PE 12/2019 via CT and is now on Xarelto, follows with pulmonology here at [facility]. Today, he is here for regular follow up. He reports his BP has been controlled at home, though has been having some low readings, has brought along his BP log. Has recently started on farxiga. He denies exertional chest pain, CHAIREZ/PND, palpitations, light-headedness, edema. He is somewhat active, does not smoke. He reports recent labs through his PCP. Past Medical History:   has a past medical history of CAD (coronary artery disease), Hyperlipidemia, Hypertension, Screening for AAA (aortic abdominal aneurysm), and Sleep apnea. Surgical History:   has a past surgical history that includes Coronary angioplasty with stent (2005); Diagnostic Cardiac Cath Lab Procedure; and knee surgery (10/2015). Social History:   reports that he quit smoking about 43 years ago. His smoking use included cigarettes. He started smoking about 62 years ago. He has a 40.00 pack-year smoking history. He has never used smokeless tobacco. He reports that he does not drink alcohol and does not use drugs. Family History:  family history includes Heart Attack in his father; Heart Disease in his father. Allergies:  Patient has no known allergies.      Review of Systems: · Constitutional: there has been no unanticipated weight loss. There's been no change in energy level, sleep pattern, or activity level. · Eyes: No visual changes or diplopia. No scleral icterus. · ENT: No Headaches, hearing loss or vertigo. No mouth sores or sore throat. · Cardiovascular: Reviewed in HPI  · Respiratory: No cough or wheezing, no sputum production. No hematemesis. · Gastrointestinal: No abdominal pain, appetite loss, blood in stools. No change in bowel or bladder habits. · Genitourinary: No dysuria, trouble voiding, or hematuria. · Musculoskeletal:  No gait disturbance, weakness or joint complaints. · Integumentary: No rash or pruritis. · Neurological: No headache, diplopia, change in muscle strength, numbness or tingling. No change in gait, balance, coordination, mood, affect, memory, mentation, behavior. · Psychiatric: No anxiety, no depression. · Endocrine: No malaise, fatigue or temperature intolerance. No excessive thirst, fluid intake, or urination. No tremor. · Hematologic/Lymphatic: No abnormal bruising or bleeding, blood clots or swollen lymph nodes. · Allergic/Immunologic: No nasal congestion or hives. Physical Examination:    Blood pressure (!) 142/70, pulse 57, height 5' 10\" (1.778 m), weight 196 lb 12.8 oz (89.3 kg), SpO2 98 %. Constitutional and General Appearance: NAD  Skin:good turgor,intact without lesions  HEENT: EOMI ,normal  Neck:no JVD    Respiratory:  · Normal excursion and expansion without use of accessory muscles  · Resp Auscultation: Normal breath sounds without dullness  Cardiovascular:  · The apical impulses not displaced  · Heart tones are crisp and normal  · Cervical veins are not engorged  · The carotid upstroke is normal in amplitude and contour without delay --positive for bruit  · Peripheral pulses are symmetrical and full. · There is no clubbing, cyanosis of the extremities.   · No edema  · Femoral Arteries: 2+ and equal  · Pedal Pulses: 2+ and equal   Abdomen:  · No masses or tenderness  · Liver/Spleen: No Abnormalities Noted  Neurological/Psychiatric:  · Alert and oriented in all spheres  · Moves all extremities well  · Exhibits normal gait balance and coordination  · No abnormalities of mood, affect, memory, mentation, or behavior are noted    Chest CT w/cx 12/13/2019 ([facility])  Small bilateral peripheral pulmonary emboli, greatest in the lung bases.  No   saddle pulmonary embolus.       Scattered bandlike opacities throughout the lungs.  Bandlike morphology   favors atelectasis or scarring rather than pneumonia       Coronary artery disease       Cholelithiasis     ECHO 7/25/2019 ([facility])  1. Left ventricle: The cavity size was normal. Wall thickness was   normal. Systolic function was normal. The estimated ejection fraction was in the range of 55% to 60%. Wall motion was normal; there were no regional wall motion abnormalities. Features are consistent with a pseudonormal left ventricular filling pattern, with concomitant abnormal relaxation and increased filling pressure (grade 2 diastolic dysfunction). Global longitudinal  strain rate of -18.40%. The longitudal strain study is borderline abnormal.  2. Right ventricle: The cavity size was normal. Wall thickness was   normal. Systolic function was normal.  3. Tricuspid valve: The tricuspid jet envelope definition was   inadequate to estimate right ventricular systolic pressure. 4. Pulmonary arteries: Systolic pressure could not be accurately   estimated. Stress echo 8/13/18  Normal stress ECHO. -Normal global systolic function with an ejection fraction estimated at 60%.  -No regional wall motion abnormalities noted.   -Mild concentric left ventricular hypertrophy.   -Normal diastolic function. E/e'=8.15    Stress echo 7/10/17:  Normal stress echo  Summary   -Normal left ventricle size and systolic function with an estimated ejection fraction of 55-60%.    -No regional wall motion abnormalities are seen.   -Mild concentric left ventricular hypertrophy is present.   -There is reversal of E/A inflow velocities across the mitral valve.   -Diastolic filling parameters suggests grade I diastolic dysfunction . E/e'=12.3 .   -No evidence of significant valvular insufficiency. GXT Myoview 7/2016  Summary    Small sized inferior fixed defect consistent with diaphragmatic artifact in    the territory of the mid and distal RCA .    Normal LV size and systolic function. Assessment:  1. Coronary artery disease: Stable. No anginal syptoms. 2005 Cath> Cypher AARON to the LAD.   8/2013 GXT Shant> normal perfusion/EF 78%, +CHAIREZ with exercise  7/2016 GXT Shant>small sized inferior fixed defect consistent with diaphragmatic artifact in the territory of the mid and distal RCA, normal LV size and function     2. Hypertension, benign: Stable. Blood pressure (!) 142/70, pulse 57, height 5' 10\" (1.778 m), weight 196 lb 12.8 oz (89.3 kg), SpO2 98 %. reports low readings in the am- will decrease cardura to 4mg bid      3. Other and unspecified hyperlipidemia:  Stable. Managed per PCP. 4.  Carotid stenosis: Stable, doppler 6/18> <50% bilateral    5. Pulmonary emboli: Noted on CT 12/2019, started taking Xarelto. Follows with  Dr. Mode Lundberg which he will continue, plan for lifelong therapy    Plan:    All cardiac test and lab results personally reviewed by me during this office visit. 1. Change doxazosin (CARDURA) to 4 MG tablet 2x daily  2. Return for regular follow up in 6 months    I appreciate the opportunity of cooperating in the care of this individual.    Kaylan Sharma. Marleny Walden M.D., [facility]    Patient's problem list, medications, allergies, past medical, surgical, social and family histories were reviewed and updated as appropriate. Scribe's attestation: This note was scribed in the presence of Dr. Marleny Walden MD, by Sherry Olivares RN.     The scribe's documentation has been prepared under my direction and personally reviewed by me in its entirety. I confirm that the note above accurately reflects all work, treatment, procedures, and medical decision making performed by me.

## 2021-09-29 ENCOUNTER — OFFICE VISIT (OUTPATIENT)
Dept: CARDIOLOGY CLINIC | Age: 76
End: 2021-09-29
Payer: MEDICARE

## 2021-09-29 VITALS
SYSTOLIC BLOOD PRESSURE: 142 MMHG | DIASTOLIC BLOOD PRESSURE: 70 MMHG | BODY MASS INDEX: 28.18 KG/M2 | HEART RATE: 57 BPM | WEIGHT: 196.8 LBS | OXYGEN SATURATION: 98 % | HEIGHT: 70 IN

## 2021-09-29 DIAGNOSIS — I10 ESSENTIAL HYPERTENSION: ICD-10-CM

## 2021-09-29 DIAGNOSIS — I65.23 BILATERAL CAROTID ARTERY STENOSIS: ICD-10-CM

## 2021-09-29 DIAGNOSIS — I25.10 CORONARY ARTERY DISEASE INVOLVING NATIVE CORONARY ARTERY OF NATIVE HEART WITHOUT ANGINA PECTORIS: Primary | ICD-10-CM

## 2021-09-29 DIAGNOSIS — E78.49 OTHER HYPERLIPIDEMIA: ICD-10-CM

## 2021-09-29 DIAGNOSIS — I26.99 OTHER PULMONARY EMBOLISM WITHOUT ACUTE COR PULMONALE, UNSPECIFIED CHRONICITY (HCC): ICD-10-CM

## 2021-09-29 PROCEDURE — 99214 OFFICE O/P EST MOD 30 MIN: CPT | Performed by: INTERNAL MEDICINE

## 2021-09-29 PROCEDURE — 1036F TOBACCO NON-USER: CPT | Performed by: INTERNAL MEDICINE

## 2021-09-29 PROCEDURE — 4040F PNEUMOC VAC/ADMIN/RCVD: CPT | Performed by: INTERNAL MEDICINE

## 2021-09-29 PROCEDURE — G8417 CALC BMI ABV UP PARAM F/U: HCPCS | Performed by: INTERNAL MEDICINE

## 2021-09-29 PROCEDURE — G8427 DOCREV CUR MEDS BY ELIG CLIN: HCPCS | Performed by: INTERNAL MEDICINE

## 2021-09-29 PROCEDURE — 1123F ACP DISCUSS/DSCN MKR DOCD: CPT | Performed by: INTERNAL MEDICINE

## 2021-09-29 RX ORDER — DAPAGLIFLOZIN 10 MG/1
TABLET, FILM COATED ORAL
COMMUNITY
Start: 2021-08-30

## 2021-09-29 RX ORDER — DOXAZOSIN MESYLATE 4 MG/1
TABLET ORAL
Qty: 180 TABLET | Refills: 3 | Status: SHIPPED | OUTPATIENT
Start: 2021-09-29 | End: 2022-03-08 | Stop reason: SDUPTHER

## 2021-11-29 NOTE — TELEPHONE ENCOUNTER
Last OV: 9-29-21 Ian Ho  Last labs: no recent labs  Appt scheduled : 3-28-22 Chelo  Last refill:2-5-21  Rx pending,please advise

## 2021-11-29 NOTE — TELEPHONE ENCOUNTER
Medication Refill    Medication needing refilled:  carvedilol (COREG) 12.5 MG tablet [[id number]      Dosage of the medication:  12.5 MG tablet       How are you taking this medication (QD, BID, TID, QID, PRN):  TAKE 2 TABLETS BY MOUTH TWICE A DAY  30 or 90 day supply called in:   90  When will you run out of your medication:  out  Which Pharmacy are we sending the medication to?:  [facility] #[id number]- [place], [place] - [address] [phone] Joo Kumar [phone]   [facility] [address], [facility]   Phone:  [phone]  Fax:  [phone]

## 2021-11-30 RX ORDER — CARVEDILOL 12.5 MG/1
TABLET ORAL
Qty: 360 TABLET | Refills: 1 | Status: SHIPPED | OUTPATIENT
Start: 2021-11-30 | End: 2022-10-31 | Stop reason: SDUPTHER

## 2022-01-24 ENCOUNTER — OFFICE VISIT (OUTPATIENT)
Dept: PULMONOLOGY | Age: 77
End: 2022-01-24
Payer: MEDICARE

## 2022-01-24 VITALS
HEART RATE: 58 BPM | DIASTOLIC BLOOD PRESSURE: 64 MMHG | SYSTOLIC BLOOD PRESSURE: 120 MMHG | OXYGEN SATURATION: 97 % | WEIGHT: 196 LBS | BODY MASS INDEX: 28.06 KG/M2 | HEIGHT: 70 IN

## 2022-01-24 DIAGNOSIS — J44.9 COPD, MODERATE (HCC): Primary | ICD-10-CM

## 2022-01-24 DIAGNOSIS — Z86.711 HISTORY OF PULMONARY EMBOLISM: ICD-10-CM

## 2022-01-24 PROCEDURE — 4040F PNEUMOC VAC/ADMIN/RCVD: CPT | Performed by: INTERNAL MEDICINE

## 2022-01-24 PROCEDURE — 3023F SPIROM DOC REV: CPT | Performed by: INTERNAL MEDICINE

## 2022-01-24 PROCEDURE — 1123F ACP DISCUSS/DSCN MKR DOCD: CPT | Performed by: INTERNAL MEDICINE

## 2022-01-24 PROCEDURE — G8484 FLU IMMUNIZE NO ADMIN: HCPCS | Performed by: INTERNAL MEDICINE

## 2022-01-24 PROCEDURE — 1036F TOBACCO NON-USER: CPT | Performed by: INTERNAL MEDICINE

## 2022-01-24 PROCEDURE — G8417 CALC BMI ABV UP PARAM F/U: HCPCS | Performed by: INTERNAL MEDICINE

## 2022-01-24 PROCEDURE — 99214 OFFICE O/P EST MOD 30 MIN: CPT | Performed by: INTERNAL MEDICINE

## 2022-01-24 PROCEDURE — G8427 DOCREV CUR MEDS BY ELIG CLIN: HCPCS | Performed by: INTERNAL MEDICINE

## 2022-01-24 RX ORDER — RIVAROXABAN 10 MG/1
10 TABLET, FILM COATED ORAL
Qty: 90 TABLET | Refills: 3 | Status: SHIPPED | OUTPATIENT
Start: 2022-01-24

## 2022-01-24 RX ORDER — RIVAROXABAN 10 MG/1
10 TABLET, FILM COATED ORAL
Qty: 90 TABLET | Refills: 0 | Status: SHIPPED | OUTPATIENT
Start: 2022-01-24 | End: 2022-01-24

## 2022-01-24 ASSESSMENT — ENCOUNTER SYMPTOMS
BACK PAIN: 0
SINUS PRESSURE: 0
CHEST TIGHTNESS: 0
ABDOMINAL PAIN: 0
SHORTNESS OF BREATH: 0
STRIDOR: 0
SORE THROAT: 0
CHOKING: 0
APNEA: 0
CONSTIPATION: 0
ANAL BLEEDING: 0
DIARRHEA: 0
ABDOMINAL DISTENTION: 0
COUGH: 0
BLOOD IN STOOL: 0
VOICE CHANGE: 0
RHINORRHEA: 0
WHEEZING: 0

## 2022-01-24 NOTE — PROGRESS NOTES
Minus Michael    YOB: 1945     Date of Service:  1/24/2022     Chief Complaint   Patient presents with    6 Month Follow-Up     PCP feels pt should < dose of the Xarelto to 10 mg a day if he is to stay on this for lifetime         HPI patient states that he has been doing quite well, denies any shortness of breath, cough or phlegm. Continues on Xarelto for history of unprovoked PE discovered in December 2019. Patient wants to decrease the dose of Xarelto per to 10 mg daily. No Known Allergies  Outpatient Medications Marked as Taking for the 1/24/22 encounter (Office Visit) with Prince Oliveira MD   Medication Sig Dispense Refill    rivaroxaban ([place] [facility]) 10 MG TABS tablet Take 1 tablet by mouth daily (with breakfast) 90 tablet 0    carvedilol (COREG) 12.5 MG tablet TAKE 2 TABLETS BY MOUTH TWICE A  tablet 1    FARXIGA 10 MG tablet TAKE 1 TABLET BY MOUTH EVERY DAY      doxazosin (CARDURA) 4 MG tablet TAKE 1 TABLET BY MOUTH TWICE A  tablet 3    esomeprazole (NEXIUM) 20 MG delayed release capsule Take by mouth as needed      furosemide (LASIX) 20 MG tablet Take 10 mg by mouth daily       rivaroxaban (XARELTO) 20 MG TABS tablet Take 1 tablet by mouth daily (with breakfast) 90 tablet 1    NIFEdipine (PROCARDIA XL) 30 MG extended release tablet TAKE 1 TABLET BY MOUTH NIGHTLY. TAKE AS NEEDED IN THE MORNING IF SYSTOLIC IS ABOVE 062 90 tablet 2    glimepiride (AMARYL) 2 MG tablet 2 times daily       lisinopril-hydrochlorothiazide (PRINZIDE;ZESTORETIC) 20-25 MG per tablet Take 1 tablet by mouth daily 30 tablet 3    atorvastatin (LIPITOR) 20 MG tablet Take 1 tablet by mouth daily. 30 tablet 11    aspirin 81 MG tablet Take 81 mg by mouth daily.            Immunization History   Administered Date(s) Administered    COVID-19, Pfizer Purple top, DILUTE for use, 12+ yrs, 30mcg/0.3mL dose 04/07/2021, 04/28/2021    Influenza, High Dose (Fluzone 65 yrs and older) 09/23/2018, 10/01/2019       Past Medical History:   Diagnosis Date    CAD (coronary artery disease)     Hyperlipidemia     Hypertension     Screening for AAA (aortic abdominal aneurysm)     Sleep apnea     uses C-PAP     Past Surgical History:   Procedure Laterality Date    CORONARY ANGIOPLASTY WITH STENT PLACEMENT  2005    DIAGNOSTIC CARDIAC CATH LAB PROCEDURE      KNEE SURGERY  10/2015    boyreese knees     Family History   Problem Relation Age of Onset    Heart Attack Father     Heart Disease Father     High Blood Pressure Neg Hx     High Cholesterol Neg Hx     Kidney Disease Neg Hx        Review of Systems:  Review of Systems   Constitutional: Negative for activity change, appetite change, fatigue and fever. HENT: Negative for congestion, ear discharge, ear pain, postnasal drip, rhinorrhea, sinus pressure, sneezing, sore throat, tinnitus and voice change. Respiratory: Negative for apnea, cough, choking, chest tightness, shortness of breath, wheezing and stridor. Cardiovascular: Negative for chest pain, palpitations and leg swelling. Gastrointestinal: Negative for abdominal distention, abdominal pain, anal bleeding, blood in stool, constipation and diarrhea. Musculoskeletal: Negative for arthralgias, back pain and gait problem. Skin: Negative for pallor and rash. Allergic/Immunologic: Negative for environmental allergies. Neurological: Negative for dizziness, tremors, seizures, syncope, speech difficulty, weakness, light-headedness, numbness and headaches. Hematological: Negative for adenopathy. Does not bruise/bleed easily. Psychiatric/Behavioral: Negative for sleep disturbance. Vitals:    01/24/22 1110   BP: 120/64   Pulse: 58   SpO2: 97%   Weight: 196 lb (88.9 kg)   Height: 5' 10\" (1.778 m)     No flowsheet data found. Body mass index is 28.12 kg/m².      Wt Readings from Last 3 Encounters:   01/24/22 196 lb (88.9 kg)   09/29/21 196 lb 12.8 oz (89.3 kg)   03/17/21 207 lb 3.2 oz (94 kg)     BP Readings from Last 3 Encounters:   01/24/22 120/64   09/29/21 (!) 142/70   07/26/21 110/72         Physical Exam  Constitutional:       General: He is not in acute distress. Appearance: He is well-developed. He is not diaphoretic. HENT:      Mouth/Throat:      Pharynx: No oropharyngeal exudate. Cardiovascular:      Rate and Rhythm: Normal rate and regular rhythm. Heart sounds: Normal heart sounds. No murmur heard. Pulmonary:      Effort: No respiratory distress. Breath sounds: Normal breath sounds. No wheezing or rales. Chest:      Chest wall: No tenderness. Abdominal:      General: There is no distension. Palpations: There is no mass. Tenderness: There is no abdominal tenderness. There is no guarding or rebound. Musculoskeletal:         General: No swelling, tenderness or deformity. Skin:     Coloration: Skin is not pale. Findings: No erythema or rash. Neurological:      Mental Status: He is alert and oriented to person, place, and time. Cranial Nerves: No cranial nerve deficit. Motor: No abnormal muscle tone. Coordination: Coordination normal.      Deep Tendon Reflexes: Reflexes normal.             Health Maintenance   Topic Date Due    Hepatitis C screen  Never done    Lipid screen  Never done    Depression Screen  Never done    DTaP/Tdap/Td vaccine (1 - Tdap) Never done    Pneumococcal 65+ years Vaccine (1 of 1 - PPSV23) Never done    Shingles Vaccine (2 of 3) 12/24/2015    Potassium monitoring  04/24/2019    Creatinine monitoring  04/24/2019    Annual Wellness Visit (AWV)  Never done    COVID-19 Vaccine (3 - Booster for Pfizer series) 09/28/2021    Flu vaccine  Completed    Hepatitis A vaccine  Aged Out    Hepatitis B vaccine  Aged Out    Hib vaccine  Aged Out    Meningococcal (ACWY) vaccine  Aged Out          Assessment/Plan:    Patient currently asymptomatic with no limitation of exercise capacity.   Has evidence of

## 2022-01-31 ENCOUNTER — TELEPHONE (OUTPATIENT)
Dept: PULMONOLOGY | Age: 77
End: 2022-01-31

## 2022-01-31 NOTE — LETTER
[facility]  [address]  [address] [place] [address]  Phone: [phone]  Fax: [phone]    Dipesh Bonner MD        2022      Eliansahara Carlie  1945 is ok to hold Xarelto for 72 hours prior to tooth extraction. If you have any question please call.       Sincerely,        Dipesh Bonner MD

## 2022-03-08 RX ORDER — DOXAZOSIN MESYLATE 4 MG/1
TABLET ORAL
Qty: 180 TABLET | Refills: 3 | Status: SHIPPED | OUTPATIENT
Start: 2022-03-08 | End: 2022-04-20

## 2022-03-08 NOTE — TELEPHONE ENCOUNTER
Medication Refill    Medication needing refilled:  doxazosin (CARDURA) 4 MG ONLY HAS ONE DOSE LEFT       Dosage of the medication:    How are you taking this medication (QD, BID, TID, QID, PRN):1 TABLET BY MOUTH TWICE A DAY    30 or 90 day supply called in: 90 day supply    When will you run out of your medication:    Which Pharmacy are we sending the medication to?:   [facility]/pharmacy #[id number]- [place] [address] [address] [phone] Daily Ramirez [phone]   [facility] [address], [facility]   Phone:  [phone]  Fax:  [phone]

## 2022-04-19 NOTE — PROGRESS NOTES
[facility]   Cardiac Follow-up     Referring Provider:  Beltran Romero MD     Chief Complaint   Patient presents with    6 Month Follow-Up     Patient here today for his 6 month follow up     Hyperlipidemia    Hypertension    Coronary Artery Disease     History of Present Illness:  Mr. Jean Burns is a 64 y.o. male who has a history of CAD, hypertension, hyperlipidemia, carotid stenosis. He tends to run low B/P. However, back in the summer 2019, he was admitted to [facility] hypertensive encephalopathy, had neuro symptoms affecting his speech; he was readmitted a few weeks later with transient confusion and expressive aphasia that resolved with hypertensive correction with IV labetalol. He was diagnosed with a PE 12/2019 via CT and is now on Xarelto, follows with pulmonology here at [facility]. Today, he is here for regular follow up. Pt denies exertional chest pain, CHAIREZ/PND, palpitations, light-headedness, edema. Pt brought BP home log and BPs are stable. Past Medical History:   has a past medical history of CAD (coronary artery disease), Hyperlipidemia, Hypertension, Screening for AAA (aortic abdominal aneurysm), and Sleep apnea. Surgical History:   has a past surgical history that includes Coronary angioplasty with stent (2005); Diagnostic Cardiac Cath Lab Procedure; and knee surgery (10/2015). Social History:   reports that he quit smoking about 43 years ago. His smoking use included cigarettes. He started smoking about 63 years ago. He has a 40.00 pack-year smoking history. He has never used smokeless tobacco. He reports that he does not drink alcohol and does not use drugs. Family History:  family history includes Heart Attack in his father; Heart Disease in his father. Allergies:  Patient has no known allergies. Review of Systems:   · Constitutional: there has been no unanticipated weight loss. There's been no change in energy level, sleep pattern, or activity level.      · Eyes: No visual changes or diplopia. No scleral icterus. · ENT: No Headaches, hearing loss or vertigo. No mouth sores or sore throat. · Cardiovascular: Reviewed in HPI  · Respiratory: No cough or wheezing, no sputum production. No hematemesis. · Gastrointestinal: No abdominal pain, appetite loss, blood in stools. No change in bowel or bladder habits. · Genitourinary: No dysuria, trouble voiding, or hematuria. · Musculoskeletal:  No gait disturbance, weakness or joint complaints. · Integumentary: No rash or pruritis. · Neurological: No headache, diplopia, change in muscle strength, numbness or tingling. No change in gait, balance, coordination, mood, affect, memory, mentation, behavior. · Psychiatric: No anxiety, no depression. · Endocrine: No malaise, fatigue or temperature intolerance. No excessive thirst, fluid intake, or urination. No tremor. · Hematologic/Lymphatic: No abnormal bruising or bleeding, blood clots or swollen lymph nodes. · Allergic/Immunologic: No nasal congestion or hives. Physical Examination:    Blood pressure 112/60, pulse 67, resp. rate 18, height 5' 10\" (1.778 m), weight 194 lb (88 kg), SpO2 96 %. Constitutional and General Appearance: NAD  Skin:good turgor,intact without lesions  HEENT: EOMI ,normal  Neck:no JVD    Respiratory:  · Normal excursion and expansion without use of accessory muscles  · Resp Auscultation: Normal breath sounds without dullness  Cardiovascular:  · The apical impulses not displaced  · Heart tones are crisp and normal  · Cervical veins are not engorged  · The carotid upstroke is normal in amplitude and contour without delay --positive for bruit  · Peripheral pulses are symmetrical and full. · There is no clubbing, cyanosis of the extremities.   · No edema  · Femoral Arteries: 2+ and equal  · Pedal Pulses: 2+ and equal   Abdomen:  · No masses or tenderness  · Liver/Spleen: No Abnormalities Noted  Neurological/Psychiatric:  · Alert and oriented in all spheres  · Moves all extremities well  · Exhibits normal gait balance and coordination  · No abnormalities of mood, affect, memory, mentation, or behavior are noted    6/9/2020 Echocardiogram  Summary   -Normal left ventricle size, mild to moderate wall thickness and normal   systolic function with an estimated ejection fraction of 55-60%. -No regional wall motion abnormalities are seen.   -Indeterminate diastolic function. E/e\"=12.2   -Trivial mitral regurgitation. Chest CT w/cx 12/31/2019 ([facility])  Small bilateral peripheral pulmonary emboli, greatest in the lung bases.  No   saddle pulmonary embolus.       Scattered bandlike opacities throughout the lungs.  Bandlike morphology   favors atelectasis or scarring rather than pneumonia       Coronary artery disease       Cholelithiasis     ECHO 7/25/2019 ([facility])  1. Left ventricle: The cavity size was normal. Wall thickness was   normal. Systolic function was normal. The estimated ejection fraction was in the range of 55% to 60%. Wall motion was normal; there were no regional wall motion abnormalities. Features are consistent with a pseudonormal left ventricular filling pattern, with concomitant abnormal relaxation and increased filling pressure (grade 2 diastolic dysfunction). Global longitudinal  strain rate of -18.40%. The longitudal strain study is borderline abnormal.  2. Right ventricle: The cavity size was normal. Wall thickness was   normal. Systolic function was normal.  3. Tricuspid valve: The tricuspid jet envelope definition was   inadequate to estimate right ventricular systolic pressure. 4. Pulmonary arteries: Systolic pressure could not be accurately   estimated. Stress echo 8/13/18  Normal stress ECHO. -Normal global systolic function with an ejection fraction estimated at 60%.  -No regional wall motion abnormalities noted.   -Mild concentric left ventricular hypertrophy.   -Normal diastolic function.  E/e'=8.15    Stress echo 7/10/17:  Normal stress echo  Summary   -Normal left ventricle size and systolic function with an estimated ejection fraction of 55-60%.  -No regional wall motion abnormalities are seen.   -Mild concentric left ventricular hypertrophy is present.   -There is reversal of E/A inflow velocities across the mitral valve.   -Diastolic filling parameters suggests grade I diastolic dysfunction . E/e'=12.3 .   -No evidence of significant valvular insufficiency. GXT Myoview 7/2016  Summary    Small sized inferior fixed defect consistent with diaphragmatic artifact in    the territory of the mid and distal RCA .    Normal LV size and systolic function. Assessment:  1. Coronary artery disease: Stable. 2005 Cath> Cypher AARON to the LAD.   8/2013 GXT Shant> normal perfusion/EF 78%, +CHAIREZ with exercise  7/2016 GXT Shant>small sized inferior fixed defect consistent with diaphragmatic artifact in the territory of the mid and distal RCA, normal LV size and function     2. Hypertension, benign: Stable. Blood pressure 112/60, pulse 67, resp. rate 18, height 5' 10\" (1.778 m), weight 194 lb (88 kg), SpO2 96 %. decrease cardura to 4mg nightly    Home BPs stable   3. Other and unspecified hyperlipidemia:  Stable. Managed per PCP. Continue lipitor   4. Carotid stenosis: Stable, doppler 6/18> <50% bilateral    5. Pulmonary emboli: Noted on CT 12/2019, started taking Xarelto. Follows with  Dr. Chuck Dougherty which he will continue, plan for lifelong therapy    Plan:    Cardiac test and lab results personally reviewed by me during this office visit and discussed   Decrease doxazosin to 4mg at bedtime only - call office if BP runs high  Return for follow up in 6 months      I appreciate the opportunity of cooperating in the care of this individual.    Nomi De Los Santos M.D., [facility]    Patient's problem list, medications, allergies, past medical, surgical, social and family histories were reviewed and updated as

## 2022-04-20 ENCOUNTER — OFFICE VISIT (OUTPATIENT)
Dept: CARDIOLOGY CLINIC | Age: 77
End: 2022-04-20
Payer: MEDICARE

## 2022-04-20 VITALS
WEIGHT: 194 LBS | DIASTOLIC BLOOD PRESSURE: 60 MMHG | HEART RATE: 67 BPM | BODY MASS INDEX: 27.77 KG/M2 | OXYGEN SATURATION: 96 % | HEIGHT: 70 IN | SYSTOLIC BLOOD PRESSURE: 112 MMHG | RESPIRATION RATE: 18 BRPM

## 2022-04-20 DIAGNOSIS — E78.49 OTHER HYPERLIPIDEMIA: ICD-10-CM

## 2022-04-20 DIAGNOSIS — I26.99 OTHER PULMONARY EMBOLISM WITHOUT ACUTE COR PULMONALE, UNSPECIFIED CHRONICITY (HCC): ICD-10-CM

## 2022-04-20 DIAGNOSIS — I10 ESSENTIAL HYPERTENSION: ICD-10-CM

## 2022-04-20 DIAGNOSIS — I65.23 BILATERAL CAROTID ARTERY STENOSIS: ICD-10-CM

## 2022-04-20 DIAGNOSIS — I25.10 CORONARY ARTERY DISEASE INVOLVING NATIVE CORONARY ARTERY OF NATIVE HEART WITHOUT ANGINA PECTORIS: Primary | ICD-10-CM

## 2022-04-20 PROCEDURE — G8427 DOCREV CUR MEDS BY ELIG CLIN: HCPCS | Performed by: INTERNAL MEDICINE

## 2022-04-20 PROCEDURE — 4040F PNEUMOC VAC/ADMIN/RCVD: CPT | Performed by: INTERNAL MEDICINE

## 2022-04-20 PROCEDURE — 99214 OFFICE O/P EST MOD 30 MIN: CPT | Performed by: INTERNAL MEDICINE

## 2022-04-20 PROCEDURE — G8417 CALC BMI ABV UP PARAM F/U: HCPCS | Performed by: INTERNAL MEDICINE

## 2022-04-20 PROCEDURE — 1036F TOBACCO NON-USER: CPT | Performed by: INTERNAL MEDICINE

## 2022-04-20 PROCEDURE — 1123F ACP DISCUSS/DSCN MKR DOCD: CPT | Performed by: INTERNAL MEDICINE

## 2022-04-20 RX ORDER — DOXAZOSIN MESYLATE 4 MG/1
TABLET ORAL
Qty: 90 TABLET | Refills: 3
Start: 2022-04-20

## 2022-04-20 NOTE — PATIENT INSTRUCTIONS
Decrease doxazosin to 4mg at bedtime only - call office if BP runs high  Return for follow up in 6 months

## 2022-10-07 ENCOUNTER — OFFICE VISIT (OUTPATIENT)
Dept: PULMONOLOGY | Age: 77
End: 2022-10-07
Payer: MEDICARE

## 2022-10-07 VITALS
HEART RATE: 70 BPM | DIASTOLIC BLOOD PRESSURE: 68 MMHG | HEIGHT: 70 IN | WEIGHT: 192 LBS | BODY MASS INDEX: 27.49 KG/M2 | SYSTOLIC BLOOD PRESSURE: 130 MMHG | OXYGEN SATURATION: 97 %

## 2022-10-07 DIAGNOSIS — J44.9 COPD, MODERATE (HCC): Primary | ICD-10-CM

## 2022-10-07 DIAGNOSIS — Z86.711 HISTORY OF PULMONARY EMBOLISM: ICD-10-CM

## 2022-10-07 PROCEDURE — 99213 OFFICE O/P EST LOW 20 MIN: CPT | Performed by: INTERNAL MEDICINE

## 2022-10-07 PROCEDURE — 1123F ACP DISCUSS/DSCN MKR DOCD: CPT | Performed by: INTERNAL MEDICINE

## 2022-10-07 PROCEDURE — G8484 FLU IMMUNIZE NO ADMIN: HCPCS | Performed by: INTERNAL MEDICINE

## 2022-10-07 PROCEDURE — G8417 CALC BMI ABV UP PARAM F/U: HCPCS | Performed by: INTERNAL MEDICINE

## 2022-10-07 PROCEDURE — 1036F TOBACCO NON-USER: CPT | Performed by: INTERNAL MEDICINE

## 2022-10-07 PROCEDURE — G8427 DOCREV CUR MEDS BY ELIG CLIN: HCPCS | Performed by: INTERNAL MEDICINE

## 2022-10-07 PROCEDURE — 3023F SPIROM DOC REV: CPT | Performed by: INTERNAL MEDICINE

## 2022-10-07 RX ORDER — ASPIRIN 81 MG/1
81 TABLET ORAL DAILY
COMMUNITY

## 2022-10-07 ASSESSMENT — ENCOUNTER SYMPTOMS
BACK PAIN: 0
STRIDOR: 0
SORE THROAT: 0
CONSTIPATION: 0
VOICE CHANGE: 0
ABDOMINAL DISTENTION: 0
COUGH: 0
APNEA: 0
WHEEZING: 0
DIARRHEA: 0
ANAL BLEEDING: 0
RHINORRHEA: 0
BLOOD IN STOOL: 0
CHOKING: 0
CHEST TIGHTNESS: 0
ABDOMINAL PAIN: 0
SINUS PRESSURE: 0
SHORTNESS OF BREATH: 0

## 2022-10-07 NOTE — PROGRESS NOTES
Nadia Leary    YOB: 1945     Date of Service:  10/7/2022     Chief Complaint   Patient presents with    6 Month Follow-Up    COPD         HPI denies any shortness of breath, cough or phlegm. Continues on Xarelto 10 mg daily as maintenance therapy for unprovoked PE-originally discovered in December 2019. Tolerating well, minor skin bruises only. No Known Allergies  Outpatient Medications Marked as Taking for the 10/7/22 encounter (Office Visit) with Zackary Robertson MD   Medication Sig Dispense Refill    aspirin 81 MG EC tablet Take 81 mg by mouth daily      doxazosin (CARDURA) 4 MG tablet Take 4 mg tablet at night 90 tablet 3    rivaroxaban (XARELTO) 10 MG TABS tablet Take 1 tablet by mouth daily (with breakfast) 90 tablet 3    carvedilol (COREG) 12.5 MG tablet TAKE 2 TABLETS BY MOUTH TWICE A  tablet 1    FARXIGA 10 MG tablet TAKE 1 TABLET BY MOUTH EVERY DAY      esomeprazole (NEXIUM) 20 MG delayed release capsule Take by mouth as needed      furosemide (LASIX) 20 MG tablet Take 10 mg by mouth daily       NIFEdipine (PROCARDIA XL) 30 MG extended release tablet TAKE 1 TABLET BY MOUTH NIGHTLY. TAKE AS NEEDED IN THE MORNING IF SYSTOLIC IS ABOVE 197 90 tablet 2    glimepiride (AMARYL) 2 MG tablet 2 times daily       lisinopril-hydrochlorothiazide (PRINZIDE;ZESTORETIC) 20-25 MG per tablet Take 1 tablet by mouth daily 30 tablet 3    atorvastatin (LIPITOR) 20 MG tablet Take 1 tablet by mouth daily.  30 tablet 11       Immunization History   Administered Date(s) Administered    COVID-19, PFIZER PURPLE top, DILUTE for use, (age 15 y+), 30mcg/0.3mL 04/07/2021, 04/28/2021    Influenza, High Dose (Fluzone 65 yrs and older) 09/23/2018, 10/01/2019       Past Medical History:   Diagnosis Date    CAD (coronary artery disease)     Hyperlipidemia     Hypertension     Screening for AAA (aortic abdominal aneurysm)     Sleep apnea     uses C-PAP     Past Surgical History:   Procedure Laterality Date    CORONARY ANGIOPLASTY WITH STENT PLACEMENT  2005    DIAGNOSTIC CARDIAC CATH LAB PROCEDURE      KNEE SURGERY  10/2015    boyreese knees     Family History   Problem Relation Age of Onset    Heart Attack Father     Heart Disease Father     High Blood Pressure Neg Hx     High Cholesterol Neg Hx     Kidney Disease Neg Hx        Review of Systems:  Review of Systems   Constitutional:  Negative for activity change, appetite change, fatigue and fever. HENT:  Negative for congestion, ear discharge, ear pain, postnasal drip, rhinorrhea, sinus pressure, sneezing, sore throat, tinnitus and voice change. Respiratory:  Negative for apnea, cough, choking, chest tightness, shortness of breath, wheezing and stridor. Cardiovascular:  Negative for chest pain, palpitations and leg swelling. Gastrointestinal:  Negative for abdominal distention, abdominal pain, anal bleeding, blood in stool, constipation and diarrhea. Musculoskeletal:  Negative for arthralgias, back pain and gait problem. Skin:  Negative for pallor and rash. Allergic/Immunologic: Negative for environmental allergies. Neurological:  Negative for dizziness, tremors, seizures, syncope, speech difficulty, weakness, light-headedness, numbness and headaches. Hematological:  Negative for adenopathy. Does not bruise/bleed easily. Psychiatric/Behavioral:  Negative for sleep disturbance. Vitals:    10/07/22 1353   BP: 130/68   Pulse: 70   SpO2: 97%   Weight: 192 lb (87.1 kg)   Height: 5' 10\" (1.778 m)     No flowsheet data found. Body mass index is 27.55 kg/m². Wt Readings from Last 3 Encounters:   10/07/22 192 lb (87.1 kg)   04/20/22 194 lb (88 kg)   01/24/22 196 lb (88.9 kg)     BP Readings from Last 3 Encounters:   10/07/22 130/68   04/20/22 112/60   01/24/22 120/64         Physical Exam  Constitutional:       General: He is not in acute distress. Appearance: He is well-developed. He is not ill-appearing or diaphoretic.    HENT: Mouth/Throat:      Pharynx: No oropharyngeal exudate. Cardiovascular:      Rate and Rhythm: Normal rate and regular rhythm. Heart sounds: Normal heart sounds. No murmur heard. No friction rub. Pulmonary:      Effort: No respiratory distress. Breath sounds: Normal breath sounds. No wheezing, rhonchi or rales. Chest:      Chest wall: No tenderness. Abdominal:      General: There is no distension. Palpations: There is no mass. Tenderness: There is no abdominal tenderness. There is no guarding or rebound. Musculoskeletal:         General: No swelling or tenderness. Skin:     Coloration: Skin is not pale. Findings: No erythema or rash. Neurological:      Mental Status: He is alert and oriented to person, place, and time. Cranial Nerves: No cranial nerve deficit. Motor: No abnormal muscle tone. Coordination: Coordination normal.      Deep Tendon Reflexes: Reflexes normal.           Health Maintenance   Topic Date Due    Pneumococcal 65+ years Vaccine (1 - PCV) Never done    Depression Screen  Never done    Hepatitis C screen  Never done    DTaP/Tdap/Td vaccine (1 - Tdap) Never done    Shingles vaccine (2 of 3) 12/24/2015    Annual Wellness Visit (AWV)  Never done    Lipids  07/25/2020    COVID-19 Vaccine (3 - Booster for Pfizer series) 09/28/2021    Flu vaccine (1) 08/01/2022    Hepatitis A vaccine  Aged Out    Hib vaccine  Aged Out    Meningococcal (ACWY) vaccine  Aged Out          Assessment/Plan:    COPD based on prior PFT study with FEV1 of 2.18 L [73% predicted] based on prior PFT from July 2020. Asymptomatic and not requiring inhaler therapy. On low maintenance dose of Xarelto 10 mg daily for history of unprovoked PE, which he should continue lifelong. This has previously been discussed with patient who agrees with the plan. Would recommend PCP to continue with maintenance dose prescriptions for Xarelto.   I would always be happy to see the patient if any need arises. No follow-ups have been organized with me for    No follow-ups on file.

## 2022-10-11 NOTE — PROGRESS NOTES
[facility]   Cardiac Follow-up     Referring Provider:  Geraldo Hodges MD     Chief Complaint   Patient presents with    Coronary Artery Disease     No cardiac syptoms at this [facility]    6 Month Follow-Up       History of Present Illness:  Mr. Joseph De Los Santos is a 76 y.o. male who has a history of CAD, hypertension, hyperlipidemia, carotid stenosis. He tends to run low B/P. However, back in the summer 2019, he was admitted to [facility] hypertensive encephalopathy, had neuro symptoms affecting his speech; he was readmitted a few weeks later with transient confusion and expressive aphasia that resolved with hypertensive correction with IV labetalol. He was diagnosed with a PE 12/2019 via CT and is now on Xarelto, follows with pulmonology here at [facility]. Today, he is here for 6 mos follow up. Pt denies exertional chest pain, CHAIREZ/PND, palpitations, light-headedness, edema. Iasiah's long time PCP has retired and he has an appointment soon with NP to establish care and NP will be ordering labs. Past Medical History:   has a past medical history of CAD (coronary artery disease), Hyperlipidemia, Hypertension, Screening for AAA (aortic abdominal aneurysm), and Sleep apnea. Surgical History:   has a past surgical history that includes Coronary angioplasty with stent (2005); Diagnostic Cardiac Cath Lab Procedure; and knee surgery (10/2015). Social History:   reports that he quit smoking about 44 years ago. His smoking use included cigarettes. He started smoking about 63 years ago. He has a 40.00 pack-year smoking history. He has never used smokeless tobacco. He reports that he does not drink alcohol and does not use drugs. Family History:  family history includes Heart Attack in his father; Heart Disease in his father. Allergies:  Patient has no known allergies. Review of Systems:   Constitutional: there has been no unanticipated weight loss.  There's been no change in energy level, sleep pattern, or activity level. Eyes: No visual changes or diplopia. No scleral icterus. ENT: No Headaches, hearing loss or vertigo. No mouth sores or sore throat. Cardiovascular: Reviewed in HPI  Respiratory: No cough or wheezing, no sputum production. No hematemesis. Gastrointestinal: No abdominal pain, appetite loss, blood in stools. No change in bowel or bladder habits. Genitourinary: No dysuria, trouble voiding, or hematuria. Musculoskeletal:  No gait disturbance, weakness or joint complaints. Integumentary: No rash or pruritis. Neurological: No headache, diplopia, change in muscle strength, numbness or tingling. No change in gait, balance, coordination, mood, affect, memory, mentation, behavior. Psychiatric: No anxiety, no depression. Endocrine: No malaise, fatigue or temperature intolerance. No excessive thirst, fluid intake, or urination. No tremor. Hematologic/Lymphatic: No abnormal bruising or bleeding, blood clots or swollen lymph nodes. Allergic/Immunologic: No nasal congestion or hives. Physical Examination:    Blood pressure 130/60, pulse 59, height 5' 10\" (1.778 m), weight 191 lb 11.2 oz (87 kg), SpO2 95 %. Constitutional and General Appearance: NAD  Skin:good turgor,intact without lesions  HEENT: EOMI ,normal  Neck:no JVD    Respiratory:  Normal excursion and expansion without use of accessory muscles  Resp Auscultation: Normal breath sounds without dullness  Cardiovascular: The apical impulses not displaced  Heart tones are crisp and normal  Cervical veins are not engorged  The carotid upstroke is normal in amplitude and contour without delay --positive for bruit  Peripheral pulses are symmetrical and full. There is no clubbing, cyanosis of the extremities.   No edema  Femoral Arteries: 2+ and equal  Pedal Pulses: 2+ and equal   Abdomen:  No masses or tenderness  Liver/Spleen: No Abnormalities Noted  Neurological/Psychiatric:  Alert and oriented in all spheres  Moves all extremities well  Exhibits normal gait balance and coordination  No abnormalities of mood, affect, memory, mentation, or behavior are noted    6/9/2020 Echocardiogram  Summary   -Normal left ventricle size, mild to moderate wall thickness and normal   systolic function with an estimated ejection fraction of 55-60%. -No regional wall motion abnormalities are seen.   -Indeterminate diastolic function. E/e\"=12.2   -Trivial mitral regurgitation. Chest CT w/cx 12/31/2019 ([facility])  Small bilateral peripheral pulmonary emboli, greatest in the lung bases. No   saddle pulmonary embolus. Scattered bandlike opacities throughout the lungs. Bandlike morphology   favors atelectasis or scarring rather than pneumonia       Coronary artery disease       Cholelithiasis     ECHO 7/25/2019 ([facility])  1. Left ventricle: The cavity size was normal. Wall thickness was   normal. Systolic function was normal. The estimated ejection fraction was in the range of 55% to 60%. Wall motion was normal; there were no regional wall motion abnormalities. Features are consistent with a pseudonormal left ventricular filling pattern, with concomitant abnormal relaxation and increased filling pressure (grade 2 diastolic dysfunction). Global longitudinal  strain rate of -18.40%. The longitudal strain study is borderline abnormal.  2. Right ventricle: The cavity size was normal. Wall thickness was   normal. Systolic function was normal.  3. Tricuspid valve: The tricuspid jet envelope definition was   inadequate to estimate right ventricular systolic pressure. 4. Pulmonary arteries: Systolic pressure could not be accurately   estimated. Stress echo 8/13/18  Normal stress ECHO. -Normal global systolic function with an ejection fraction estimated at 60%. -No regional wall motion abnormalities noted. -Mild concentric left ventricular hypertrophy.   -Normal diastolic function.  E/e'=8.15    Stress echo 7/10/17:  Normal stress echo  Summary   -Normal left ventricle size and systolic function with an estimated ejection fraction of 55-60%. -No regional wall motion abnormalities are seen. -Mild concentric left ventricular hypertrophy is present.   -There is reversal of E/A inflow velocities across the mitral valve. -Diastolic filling parameters suggests grade I diastolic dysfunction . E/e'=12.3 .   -No evidence of significant valvular insufficiency. GXT Myoview 7/2016  Summary    Small sized inferior fixed defect consistent with diaphragmatic artifact in    the territory of the mid and distal RCA . Normal LV size and systolic function. Assessment:  1. Coronary artery disease:   Stable  Denies anginal symptoms   2005 Cath> Cypher AARON to the LAD.   8/2013 GXT Shant> normal perfusion/EF 78%, +CHAIREZ with exercise  7/2016 GXT Shant>small sized inferior fixed defect consistent with diaphragmatic artifact in the territory of the mid and distal RCA, normal LV size and function  Continue ASA/statin     2. Hypertension, benign:   Stable. Blood pressure 130/60, pulse 59, height 5' 10\" (1.778 m), weight 191 lb 11.2 oz (87 kg), SpO2 95 %. Cardura 4mg nightly    Home BPs remain stable     3. Other and unspecified hyperlipidemia:    Stable   Managed per PCP. Continue Lipitor 20 mg daily      4. Carotid stenosis:        Stable, doppler 6/18> <50% bilateral    5. Pulmonary emboli H/O:         Noted on CT 12/2019        continue taking Xarelto, plan for lifelong therapy, minor skin bruises only        Follows with  Dr. Alis Abel (pulmonology)    Plan:    Cardiac test and lab results personally reviewed by me during this office visit and discussed. No medication changes  Continue risk factor modifications. Call for any change in symptoms, call to report any changes in shortness of breath or development of chest pain with activity. Follow up in 6 mos    I appreciate the opportunity of cooperating in the care of this individual.    Natasha Hughes.  Bria Lucas M.D., [facility]    Patient's problem list, medications, allergies, past medical, surgical, social and family histories were reviewed and updated as appropriate. Scribe's attestation: This note was scribed in the presence of Dr Anna King by Ananya Monterroso RN. The scribe's documentation has been prepared under my direction and personally reviewed by me in its entirety. I confirm that the note above accurately reflects all work, treatment, procedures, and medical decision making performed by me.

## 2022-10-31 ENCOUNTER — OFFICE VISIT (OUTPATIENT)
Dept: CARDIOLOGY CLINIC | Age: 77
End: 2022-10-31
Payer: MEDICARE

## 2022-10-31 VITALS
SYSTOLIC BLOOD PRESSURE: 130 MMHG | OXYGEN SATURATION: 95 % | BODY MASS INDEX: 27.44 KG/M2 | HEIGHT: 70 IN | HEART RATE: 59 BPM | DIASTOLIC BLOOD PRESSURE: 60 MMHG | WEIGHT: 191.7 LBS

## 2022-10-31 DIAGNOSIS — E78.49 OTHER HYPERLIPIDEMIA: ICD-10-CM

## 2022-10-31 DIAGNOSIS — I65.23 BILATERAL CAROTID ARTERY STENOSIS: ICD-10-CM

## 2022-10-31 DIAGNOSIS — I10 ESSENTIAL HYPERTENSION: ICD-10-CM

## 2022-10-31 DIAGNOSIS — I25.10 CORONARY ARTERY DISEASE INVOLVING NATIVE CORONARY ARTERY OF NATIVE HEART WITHOUT ANGINA PECTORIS: Primary | ICD-10-CM

## 2022-10-31 DIAGNOSIS — I26.99 OTHER PULMONARY EMBOLISM WITHOUT ACUTE COR PULMONALE, UNSPECIFIED CHRONICITY (HCC): ICD-10-CM

## 2022-10-31 PROCEDURE — 1036F TOBACCO NON-USER: CPT | Performed by: INTERNAL MEDICINE

## 2022-10-31 PROCEDURE — 1123F ACP DISCUSS/DSCN MKR DOCD: CPT | Performed by: INTERNAL MEDICINE

## 2022-10-31 PROCEDURE — 99214 OFFICE O/P EST MOD 30 MIN: CPT | Performed by: INTERNAL MEDICINE

## 2022-10-31 PROCEDURE — G8427 DOCREV CUR MEDS BY ELIG CLIN: HCPCS | Performed by: INTERNAL MEDICINE

## 2022-10-31 PROCEDURE — G8417 CALC BMI ABV UP PARAM F/U: HCPCS | Performed by: INTERNAL MEDICINE

## 2022-10-31 PROCEDURE — G8484 FLU IMMUNIZE NO ADMIN: HCPCS | Performed by: INTERNAL MEDICINE

## 2022-10-31 PROCEDURE — 3078F DIAST BP <80 MM HG: CPT | Performed by: INTERNAL MEDICINE

## 2022-10-31 PROCEDURE — 3074F SYST BP LT 130 MM HG: CPT | Performed by: INTERNAL MEDICINE

## 2022-10-31 RX ORDER — CARVEDILOL 12.5 MG/1
12.5 TABLET ORAL 2 TIMES DAILY
Qty: 180 TABLET | Refills: 3 | Status: SHIPPED | OUTPATIENT
Start: 2022-10-31

## 2023-01-19 RX ORDER — RIVAROXABAN 10 MG/1
TABLET, FILM COATED ORAL
Qty: 90 TABLET | Refills: 3 | Status: SHIPPED | OUTPATIENT
Start: 2023-01-19

## 2023-02-08 ENCOUNTER — TELEPHONE (OUTPATIENT)
Dept: CARDIOLOGY CLINIC | Age: 78
End: 2023-02-08

## 2023-02-08 NOTE — TELEPHONE ENCOUNTER
Medication Refill    Medication needing refilled:  carvedilol    Dosage of the medication:  12.5mg    How are you taking this medication (QD, BID, TID, QID, PRN):  Take 1 tablet by mouth 2 times daily TAKE 1 TABLET BY MOUTH TWICE A DAY    30 or 90 day supply called in:  180    When will you run out of your medication:    Which Pharmacy are we sending the medication to?:    [facility]/pharmacy #[id number]- [place], [place] - [facility] [address] Sheppard  [facility] [phone] - F [phone]

## 2023-02-08 NOTE — TELEPHONE ENCOUNTER
Spoke to pharmacist and told them, pt got refill on 10/31/22 w/180 tabs and 3 refills. So pt still has refills left. She v/u. Spoke to pt and relayed the message above. He v/u.

## 2023-04-25 NOTE — TELEPHONE ENCOUNTER
Received refill request for Doxazosin from [facility] pharmacy.     Last ov:10/31/2022 LES    Last Refill:04/20/2022    Next appointment:05/15/2023 LES

## 2023-04-26 RX ORDER — DOXAZOSIN MESYLATE 4 MG/1
TABLET ORAL
Qty: 180 TABLET | Refills: 3 | Status: SHIPPED | OUTPATIENT
Start: 2023-04-26

## 2023-09-13 NOTE — PROGRESS NOTES
[facility]   Cardiac Follow-up     Referring Provider:  Deb Beverly MD     Chief Complaint   Patient presents with    Coronary Artery Disease    Hypertension    Hyperlipidemia       History of Present Illness:  Mr. Vangie Wynn is a 78 y.o. male who has a history of CAD, hypertension, hyperlipidemia, carotid stenosis. He tends to run low B/P. However, back in the summer 2019, he was admitted to [facility] hypertensive encephalopathy, had neuro symptoms affecting his speech; he was readmitted a few weeks later with transient confusion and expressive aphasia that resolved with hypertensive correction with IV labetalol. He was diagnosed with a PE 12/2019 via CT and is now on Xarelto, follows with pulmonology here at [facility]. Today, he is here for 6 mos follow up. He sees NP Manuela Blackwell from Dr Xena Gamble office. He has kept busy mowing grass. Pt denies exertional chest pain, CHAIREZ/PND, palpitations, light-headedness, edema. Home BP log reviewed by me > WNL, stable. Past Medical History:   has a past medical history of CAD (coronary artery disease), Hyperlipidemia, Hypertension, Screening for AAA (aortic abdominal aneurysm), and Sleep apnea. Surgical History:   has a past surgical history that includes Coronary angioplasty with stent (2005); Diagnostic Cardiac Cath Lab Procedure; and knee surgery (10/2015). Social History:   reports that he quit smoking about 45 years ago. His smoking use included cigarettes. He started smoking about 64 years ago. He has a 40.00 pack-year smoking history. He has never used smokeless tobacco. He reports that he does not drink alcohol and does not use drugs. Family History:  family history includes Heart Attack in his father; Heart Disease in his father. Allergies:  Patient has no known allergies. Review of Systems:   Constitutional: there has been no unanticipated weight loss. There's been no change in energy level, sleep pattern, or activity level.      Eyes: No

## 2023-09-22 ENCOUNTER — OFFICE VISIT (OUTPATIENT)
Dept: CARDIOLOGY CLINIC | Age: 78
End: 2023-09-22
Payer: MEDICARE

## 2023-09-22 VITALS
HEART RATE: 68 BPM | SYSTOLIC BLOOD PRESSURE: 130 MMHG | HEIGHT: 70 IN | DIASTOLIC BLOOD PRESSURE: 70 MMHG | BODY MASS INDEX: 27.49 KG/M2 | WEIGHT: 192 LBS | OXYGEN SATURATION: 95 %

## 2023-09-22 DIAGNOSIS — E78.49 OTHER HYPERLIPIDEMIA: ICD-10-CM

## 2023-09-22 DIAGNOSIS — I65.23 BILATERAL CAROTID ARTERY STENOSIS: ICD-10-CM

## 2023-09-22 DIAGNOSIS — I26.99 OTHER PULMONARY EMBOLISM WITHOUT ACUTE COR PULMONALE, UNSPECIFIED CHRONICITY (HCC): ICD-10-CM

## 2023-09-22 DIAGNOSIS — I10 ESSENTIAL HYPERTENSION: ICD-10-CM

## 2023-09-22 DIAGNOSIS — I25.10 CORONARY ARTERY DISEASE INVOLVING NATIVE CORONARY ARTERY OF NATIVE HEART WITHOUT ANGINA PECTORIS: Primary | ICD-10-CM

## 2023-09-22 PROCEDURE — G8427 DOCREV CUR MEDS BY ELIG CLIN: HCPCS | Performed by: INTERNAL MEDICINE

## 2023-09-22 PROCEDURE — 3078F DIAST BP <80 MM HG: CPT | Performed by: INTERNAL MEDICINE

## 2023-09-22 PROCEDURE — G8417 CALC BMI ABV UP PARAM F/U: HCPCS | Performed by: INTERNAL MEDICINE

## 2023-09-22 PROCEDURE — 1123F ACP DISCUSS/DSCN MKR DOCD: CPT | Performed by: INTERNAL MEDICINE

## 2023-09-22 PROCEDURE — 3074F SYST BP LT 130 MM HG: CPT | Performed by: INTERNAL MEDICINE

## 2023-09-22 PROCEDURE — 99214 OFFICE O/P EST MOD 30 MIN: CPT | Performed by: INTERNAL MEDICINE

## 2023-09-22 PROCEDURE — 1036F TOBACCO NON-USER: CPT | Performed by: INTERNAL MEDICINE

## 2023-09-22 NOTE — PATIENT INSTRUCTIONS
No medication changes  Continue risk factor modifications. Call for any change in symptoms, call to report any changes in shortness of breath or development of chest pain with activity.     Follow up in 12 mos

## 2023-10-27 RX ORDER — CARVEDILOL 12.5 MG/1
12.5 TABLET ORAL 2 TIMES DAILY
Qty: 180 TABLET | Refills: 3 | Status: SHIPPED | OUTPATIENT
Start: 2023-10-27

## 2023-10-27 NOTE — TELEPHONE ENCOUNTER
Lov 9/22/2023  Labs 9/16/2023 Lipids   Lrf 10/31/2022 Disp 180+3  Appt no appt scheduled please advise thanks

## 2023-12-05 ENCOUNTER — TELEPHONE (OUTPATIENT)
Dept: CARDIOLOGY CLINIC | Age: 78
End: 2023-12-05

## 2024-09-04 RX ORDER — CARVEDILOL 12.5 MG/1
12.5 TABLET ORAL 2 TIMES DAILY
Qty: 180 TABLET | Refills: 3 | Status: SHIPPED | OUTPATIENT
Start: 2024-09-04

## 2024-09-04 NOTE — TELEPHONE ENCOUNTER
Received refill request for carvedilol from [facility] pharmacy.    Last ov: 09/22/2023 LES    Last Refill: 10/27/2023 #180 w/ 3    Next appointment: 11/18/2024 LES

## 2024-10-31 NOTE — PROGRESS NOTES
[facility]   Cardiac Follow-up     Referring Provider:  Anisa Sinclair APRN - CNP     Chief Complaint   Patient presents with    Follow-up     Patient denies any cardiac issues today.  Left hand has been shaking for last few months.    Hypertension    Hyperlipidemia    Coronary Artery Disease       History of Present Illness:  Mr. Martin is a 79 y.o. male who has a history of CAD, hypertension, hyperlipidemia, carotid stenosis. He tends to run low B/P. However, back in the summer 2019, he was admitted to [facility] hypertensive encephalopathy, had neuro symptoms affecting his speech; he was readmitted a few weeks later with transient confusion and expressive aphasia that resolved with hypertensive correction with IV labetalol.  He was diagnosed with a PE 12/2019 via CT and is now on Xarelto, follows with pulmonology here at [facility].      Today, he is here for 12 mos follow up. He is with his daughter.  His left hand \"shakes a lot\" per daughter's report.  Right hand also has some tremor. He is right handed.  He does not have trouble eating.  He reports his symptoms do not bother him.  He sees PCP (AFSHAN Mondragon) Thursday.  His hands stop shaking if he lies them on his leg. He is not very active. His aunt had h/o parkinson's.       Past Medical History:   has a past medical history of CAD (coronary artery disease), Hyperlipidemia, Hypertension, Screening for AAA (aortic abdominal aneurysm), and Sleep apnea.    Surgical History:   has a past surgical history that includes Coronary angioplasty with stent (2005); Diagnostic Cardiac Cath Lab Procedure; and knee surgery (10/2015).     Social History:   reports that he quit smoking about 46 years ago. His smoking use included cigarettes. He started smoking about 65 years ago. He has a 40.1 pack-year smoking history. He has never used smokeless tobacco. He reports that he does not drink alcohol and does not use drugs.     Family History:  family history includes Heart Attack

## 2024-11-18 ENCOUNTER — OFFICE VISIT (OUTPATIENT)
Dept: CARDIOLOGY CLINIC | Age: 79
End: 2024-11-18
Payer: MEDICARE

## 2024-11-18 VITALS
HEART RATE: 82 BPM | DIASTOLIC BLOOD PRESSURE: 76 MMHG | SYSTOLIC BLOOD PRESSURE: 136 MMHG | WEIGHT: 197.3 LBS | BODY MASS INDEX: 28.24 KG/M2 | OXYGEN SATURATION: 94 % | HEIGHT: 70 IN

## 2024-11-18 DIAGNOSIS — Z98.61 S/P PTCA (PERCUTANEOUS TRANSLUMINAL CORONARY ANGIOPLASTY): ICD-10-CM

## 2024-11-18 DIAGNOSIS — I26.99 OTHER PULMONARY EMBOLISM WITHOUT ACUTE COR PULMONALE, UNSPECIFIED CHRONICITY (HCC): ICD-10-CM

## 2024-11-18 DIAGNOSIS — E78.49 OTHER HYPERLIPIDEMIA: ICD-10-CM

## 2024-11-18 DIAGNOSIS — I10 ESSENTIAL HYPERTENSION: ICD-10-CM

## 2024-11-18 DIAGNOSIS — I65.23 BILATERAL CAROTID ARTERY STENOSIS: ICD-10-CM

## 2024-11-18 DIAGNOSIS — I25.10 CORONARY ARTERY DISEASE INVOLVING NATIVE CORONARY ARTERY OF NATIVE HEART WITHOUT ANGINA PECTORIS: Primary | ICD-10-CM

## 2024-11-18 PROCEDURE — G8427 DOCREV CUR MEDS BY ELIG CLIN: HCPCS | Performed by: INTERNAL MEDICINE

## 2024-11-18 PROCEDURE — 1159F MED LIST DOCD IN RCRD: CPT | Performed by: INTERNAL MEDICINE

## 2024-11-18 PROCEDURE — 3075F SYST BP GE 130 - 139MM HG: CPT | Performed by: INTERNAL MEDICINE

## 2024-11-18 PROCEDURE — 3078F DIAST BP <80 MM HG: CPT | Performed by: INTERNAL MEDICINE

## 2024-11-18 PROCEDURE — G8419 CALC BMI OUT NRM PARAM NOF/U: HCPCS | Performed by: INTERNAL MEDICINE

## 2024-11-18 PROCEDURE — 99214 OFFICE O/P EST MOD 30 MIN: CPT | Performed by: INTERNAL MEDICINE

## 2024-11-18 PROCEDURE — G8484 FLU IMMUNIZE NO ADMIN: HCPCS | Performed by: INTERNAL MEDICINE

## 2024-11-18 PROCEDURE — 1036F TOBACCO NON-USER: CPT | Performed by: INTERNAL MEDICINE

## 2024-11-18 PROCEDURE — 1123F ACP DISCUSS/DSCN MKR DOCD: CPT | Performed by: INTERNAL MEDICINE

## 2024-11-18 NOTE — PATIENT INSTRUCTIONS
recommend seeing neurologist in [place] (wife's neurologist)  No medication changes   Continue risk factor modifications.   Call for any change in symptoms, call to report any changes in shortness of breath or development of chest pain with activity.    Follow up in 12 mos